# Patient Record
Sex: FEMALE
[De-identification: names, ages, dates, MRNs, and addresses within clinical notes are randomized per-mention and may not be internally consistent; named-entity substitution may affect disease eponyms.]

---

## 2020-11-15 ENCOUNTER — HOSPITAL ENCOUNTER (EMERGENCY)
Dept: HOSPITAL 47 - EC | Age: 75
Discharge: HOME | End: 2020-11-15
Payer: MEDICARE

## 2020-11-15 VITALS — HEART RATE: 96 BPM | DIASTOLIC BLOOD PRESSURE: 70 MMHG | SYSTOLIC BLOOD PRESSURE: 119 MMHG

## 2020-11-15 VITALS — TEMPERATURE: 100.3 F

## 2020-11-15 VITALS — RESPIRATION RATE: 18 BRPM

## 2020-11-15 DIAGNOSIS — J12.89: ICD-10-CM

## 2020-11-15 DIAGNOSIS — N39.0: ICD-10-CM

## 2020-11-15 DIAGNOSIS — U07.1: Primary | ICD-10-CM

## 2020-11-15 LAB
ALBUMIN SERPL-MCNC: 4.3 G/DL (ref 3.5–5)
ALP SERPL-CCNC: 79 U/L (ref 38–126)
ALT SERPL-CCNC: 16 U/L (ref 4–34)
ANION GAP SERPL CALC-SCNC: 11 MMOL/L
AST SERPL-CCNC: 29 U/L (ref 14–36)
BASOPHILS # BLD AUTO: 0 K/UL (ref 0–0.2)
BASOPHILS NFR BLD AUTO: 1 %
BUN SERPL-SCNC: 20 MG/DL (ref 7–17)
CALCIUM SPEC-MCNC: 9.2 MG/DL (ref 8.4–10.2)
CHLORIDE SERPL-SCNC: 99 MMOL/L (ref 98–107)
CO2 SERPL-SCNC: 23 MMOL/L (ref 22–30)
EOSINOPHIL # BLD AUTO: 0 K/UL (ref 0–0.7)
EOSINOPHIL NFR BLD AUTO: 1 %
ERYTHROCYTE [DISTWIDTH] IN BLOOD BY AUTOMATED COUNT: 4.4 M/UL (ref 3.8–5.4)
ERYTHROCYTE [DISTWIDTH] IN BLOOD: 12.4 % (ref 11.5–15.5)
GLUCOSE BLD-MCNC: 84 MG/DL (ref 75–99)
GLUCOSE SERPL-MCNC: 66 MG/DL (ref 74–99)
GRAN CASTS UR QL COMP ASSIST: 1 /LPF
HCT VFR BLD AUTO: 40.3 % (ref 34–46)
HGB BLD-MCNC: 13.5 GM/DL (ref 11.4–16)
HYALINE CASTS UR QL AUTO: 13 /LPF (ref 0–2)
LYMPHOCYTES # SPEC AUTO: 1.3 K/UL (ref 1–4.8)
LYMPHOCYTES NFR SPEC AUTO: 18 %
MCH RBC QN AUTO: 30.7 PG (ref 25–35)
MCHC RBC AUTO-ENTMCNC: 33.5 G/DL (ref 31–37)
MCV RBC AUTO: 91.7 FL (ref 80–100)
MONOCYTES # BLD AUTO: 0.4 K/UL (ref 0–1)
MONOCYTES NFR BLD AUTO: 5 %
NEUTROPHILS # BLD AUTO: 5.3 K/UL (ref 1.3–7.7)
NEUTROPHILS NFR BLD AUTO: 75 %
PH UR: 5.5 [PH] (ref 5–8)
PLATELET # BLD AUTO: 243 K/UL (ref 150–450)
POTASSIUM SERPL-SCNC: 3.7 MMOL/L (ref 3.5–5.1)
PROT SERPL-MCNC: 7.9 G/DL (ref 6.3–8.2)
PROT UR QL: (no result)
RBC UR QL: 7 /HPF (ref 0–5)
SODIUM SERPL-SCNC: 133 MMOL/L (ref 137–145)
SP GR UR: 1.04 (ref 1–1.03)
SQUAMOUS UR QL AUTO: 6 /HPF (ref 0–4)
UROBILINOGEN UR QL STRIP: 2 MG/DL (ref ?–2)
WBC # BLD AUTO: 7.1 K/UL (ref 3.8–10.6)
WBC # UR AUTO: 29 /HPF (ref 0–5)

## 2020-11-15 PROCEDURE — 99285 EMERGENCY DEPT VISIT HI MDM: CPT

## 2020-11-15 PROCEDURE — 96361 HYDRATE IV INFUSION ADD-ON: CPT

## 2020-11-15 PROCEDURE — 93005 ELECTROCARDIOGRAM TRACING: CPT

## 2020-11-15 PROCEDURE — 84484 ASSAY OF TROPONIN QUANT: CPT

## 2020-11-15 PROCEDURE — 81001 URINALYSIS AUTO W/SCOPE: CPT

## 2020-11-15 PROCEDURE — 71046 X-RAY EXAM CHEST 2 VIEWS: CPT

## 2020-11-15 PROCEDURE — 87502 INFLUENZA DNA AMP PROBE: CPT

## 2020-11-15 PROCEDURE — 87086 URINE CULTURE/COLONY COUNT: CPT

## 2020-11-15 PROCEDURE — 85025 COMPLETE CBC W/AUTO DIFF WBC: CPT

## 2020-11-15 PROCEDURE — 87040 BLOOD CULTURE FOR BACTERIA: CPT

## 2020-11-15 PROCEDURE — 83605 ASSAY OF LACTIC ACID: CPT

## 2020-11-15 PROCEDURE — 80053 COMPREHEN METABOLIC PANEL: CPT

## 2020-11-15 PROCEDURE — 96374 THER/PROPH/DIAG INJ IV PUSH: CPT

## 2020-11-15 PROCEDURE — 36415 COLL VENOUS BLD VENIPUNCTURE: CPT

## 2020-11-15 NOTE — XR
EXAMINATION TYPE: XR chest 2V

 

DATE OF EXAM: 11/15/2020

 

COMPARISON: NONE

 

HISTORY: Weakness and cough

 

TECHNIQUE: 2 views

 

FINDINGS: There is some coarse infiltrate in the periphery of the left lung. There is probably some c
alcified pleural plaque on the left lateral chest wall. Thoracic aorta is atheromatous. There is no h
eart failure.

 

IMPRESSION: Coarse lateral left side pulmonary infiltrate and pleural plaque formation. No heart fail
ure seen. There is probably pulmonary interstitial fibrosis.

## 2020-11-15 NOTE — ED
General Adult HPI





- General


Source: patient, family, RN notes reviewed, old records reviewed


Mode of arrival: ambulatory


Limitations: language barrier





<Bobby Parra - Last Filed: 11/15/20 22:50>





<Ignacia Freeman - Last Filed: 11/17/20 07:46>





- General


Chief complaint: Weakness


Stated complaint: weakness


Time Seen by Provider: 11/15/20 16:13





- History of Present Illness


Initial comments: 


75-year-old female patient to ED for generalized weakness and fever with a mild 

cough for the last 3 days.  Denies any areas of pain.  Denies any shortness of 

breath.  Denies any other acute complaints.





Systemic: Pt denies fatigue, rash. Pt denies weakness, night sweats, weight 

loss. 


Neuro: Pt denies headache, visual disturbances, syncope or pre-syncope.


HEENT: Pt denies ocular discharge or irritation, otalgia, rhinorrhea, 

pharyngitis or notable lymphadenopathy. 


Cardiopulmonary: Pt denies chest pain, SOB, heart palpitations, dyspnea on 

exertion.  


Abdominal/GI: Pt denies abdominal pain, n/v/d. 


: Denies new onset urinary or bowel incontinence.  


MSK: Pt denies myalgia, loss of strength or function in extremities. 


Neuro: Pt denies new onset weakness, paresthesias. 


 (Bobby Parra)





- Related Data


                                  Previous Rx's











 Medication  Instructions  Recorded


 


Levofloxacin [Levaquin] 750 mg PO DAILY 7 Days  tab 11/15/20











                                    Allergies











Allergy/AdvReac Type Severity Reaction Status Date / Time


 


No Known Allergies Allergy   Verified 11/15/20 16:08














Review of Systems


ROS Other: All systems not noted in ROS Statement are negative.





<Bobby Parra - Last Filed: 11/15/20 22:50>


ROS Other: All systems not noted in ROS Statement are negative.





<Ignacia Freeman - Last Filed: 11/17/20 07:46>


ROS Statement: 


Those systems with pertinent positive or pertinent negative responses have been 

documented in the HPI.








Past Medical History


Past Medical History: Diabetes Mellitus


History of Any Multi-Drug Resistant Organisms: None Reported


Past Surgical History: No Surgical Hx Reported


Past Psychological History: No Psychological Hx Reported


Smoking Status: Never smoker


Past Alcohol Use History: None Reported


Past Drug Use History: None Reported





<Bobby Parra - Last Filed: 11/15/20 22:50>





General Exam


Limitations: language barrier





<Bobby Parra - Last Filed: 11/15/20 22:50>





- General Exam Comments


Initial Comments: 








Constitutional: NAD, AOX3, Pt has pleasant affect. 


HEENT: NC/AT, trachea midline, neck supple, no lymphadenopathy. External ears 

appear normal, without discharge. Mucous membranes moist. Eyes PERRLA, EOM 

intact. There is no scleral icterus. No pallor noted. 


Cardiopulmonary: RRR, no murmurs, rubs or gallops, no JVD noted. Lungs CTAB in 

anterior and posterior fields. No peripheral edema. 


Abdominal exam: Abdomen soft and non-distended. Abdomen non-tender to palpation 

in all 4 quadrants. Bowel sounds active in LLQ. No hepatosplenomegaly. No ecchym

osis


Neuro: CN II-XII intact. No nuchal rigidity. No raccon eyes, no finch sign, no 

hemotympanum. No cervical spinal tenderness. 


MSK: No posterior calf tenderness bilaterally, homans sign negative bilaterally.

 Posterior tibialis and radial pulse +2 bilaterally. Sensation intact in upper 

and lower extremities. Full active ROM in upper and lower extremities, 5/5 

stregnth. 








 (Bobby Parra)





Course





                                   Vital Signs











  11/15/20 11/15/20 11/15/20





  16:08 16:40 17:00


 


Temperature 100.3 F H  


 


Pulse Rate 124 H 108 H 101 H


 


Respiratory 20 20 20





Rate   


 


Blood Pressure 122/74 107/66 107/66


 


O2 Sat by Pulse 97 95 95





Oximetry   














  11/15/20 11/15/20





  18:00 19:00


 


Temperature  


 


Pulse Rate 98 96


 


Respiratory 18 18





Rate  


 


Blood Pressure 109/72 119/70


 


O2 Sat by Pulse 96 95





Oximetry  














Medical Decision Making





- Lab Data


Result diagrams: 


                                 11/15/20 16:29





                                 11/15/20 16:29





- EKG Data


-: EKG Interpreted by Me (and Dr. Freeman )





<Bobby Parra - Last Filed: 11/15/20 22:50>





- Lab Data


Result diagrams: 


                                 11/15/20 16:29





                                 11/15/20 16:29





<Ignacia Freeman - Last Filed: 11/17/20 07:46>





- Medical Decision Making


75 year old female patient to ED for weakness, fever, cough. Pt VSS do display 

mild fever. Laboratory investigations reveal mild UTI. x-ray reveals possible 

pulmonary fibrosis and a coarse infiltrate.  Offered admission, she reports she 

is feeling fine and wants to go home. Son did provide translation. She'll be 

placed on Levaquin for 1 week and have close patient follow-up with primary care

 provider and return precautions.  Case discussed with Dr. Freeman. 


 (Bobby Parra)


I was available for consultation in the emergency department.  The history and 

physical exam were done by the midlevel provider. I was consulted for this 

patients care. I reviewed the case with the midlevel provider and based on 

their presentation of the patient, I agree with the assessment, medical decision

 making and plan of care as documented. Recommended admission however patient 

refused. She is alert, lucid and capable of making her own decisions. 





Chart was dictated using Dragon dictation software.  Attempts were made to 

correct any dictation errors however some typographical errors may persist. 





Patient seen and evaluated during state of emergency due to Covid-19.  

(Ignacia Freeman)





- Lab Data





                                   Lab Results











  11/15/20 11/15/20 11/15/20 Range/Units





  16:29 16:29 16:29 


 


WBC  7.1    (3.8-10.6)  k/uL


 


RBC  4.40    (3.80-5.40)  m/uL


 


Hgb  13.5    (11.4-16.0)  gm/dL


 


Hct  40.3    (34.0-46.0)  %


 


MCV  91.7    (80.0-100.0)  fL


 


MCH  30.7    (25.0-35.0)  pg


 


MCHC  33.5    (31.0-37.0)  g/dL


 


RDW  12.4    (11.5-15.5)  %


 


Plt Count  243    (150-450)  k/uL


 


MPV  7.7    


 


Neutrophils %  75    %


 


Lymphocytes %  18    %


 


Monocytes %  5    %


 


Eosinophils %  1    %


 


Basophils %  1    %


 


Neutrophils #  5.3    (1.3-7.7)  k/uL


 


Lymphocytes #  1.3    (1.0-4.8)  k/uL


 


Monocytes #  0.4    (0-1.0)  k/uL


 


Eosinophils #  0.0    (0-0.7)  k/uL


 


Basophils #  0.0    (0-0.2)  k/uL


 


Sodium    133 L  (137-145)  mmol/L


 


Potassium    3.7  (3.5-5.1)  mmol/L


 


Chloride    99  ()  mmol/L


 


Carbon Dioxide    23  (22-30)  mmol/L


 


Anion Gap    11  mmol/L


 


BUN    20 H  (7-17)  mg/dL


 


Creatinine    1.02  (0.52-1.04)  mg/dL


 


Est GFR (CKD-EPI)AfAm    63  (>60 ml/min/1.73 sqM)  


 


Est GFR (CKD-EPI)NonAf    55  (>60 ml/min/1.73 sqM)  


 


Glucose    66 L  (74-99)  mg/dL


 


POC Glucose (mg/dL)     (75-99)  mg/dL


 


POC Glu Operater ID     


 


Plasma Lactic Acid Jose Luis     (0.7-2.0)  mmol/L


 


Calcium    9.2  (8.4-10.2)  mg/dL


 


Total Bilirubin    0.5  (0.2-1.3)  mg/dL


 


AST    29  (14-36)  U/L


 


ALT    16  (4-34)  U/L


 


Alkaline Phosphatase    79  ()  U/L


 


Troponin I     (0.000-0.034)  ng/mL


 


Total Protein    7.9  (6.3-8.2)  g/dL


 


Albumin    4.3  (3.5-5.0)  g/dL


 


Urine Color   Yellow   


 


Urine Appearance   Cloudy H   (Clear)  


 


Urine pH   5.5   (5.0-8.0)  


 


Ur Specific Gravity   1.039 H   (1.001-1.035)  


 


Urine Protein   2+ H   (Negative)  


 


Urine Glucose (UA)   Negative   (Negative)  


 


Urine Ketones   Negative   (Negative)  


 


Urine Blood   Negative   (Negative)  


 


Urine Nitrite   Negative   (Negative)  


 


Urine Bilirubin   Negative   (Negative)  


 


Urine Urobilinogen   2.0   (<2.0)  mg/dL


 


Ur Leukocyte Esterase   Large H   (Negative)  


 


Urine RBC   7 H   (0-5)  /hpf


 


Urine WBC   29 H   (0-5)  /hpf


 


Ur Squamous Epith Cells   6 H   (0-4)  /hpf


 


Hyaline Casts   13 H   (0-2)  /lpf


 


Granular Casts   1   (0)  /lpf


 


Urine Mucus   Few H   (None)  /hpf


 


Influenza Type A RNA     (Not Detectd)  


 


Influenza Type B (PCR)     (Not Detectd)  














  11/15/20 11/15/20 11/15/20 Range/Units





  16:29 16:29 16:29 


 


WBC     (3.8-10.6)  k/uL


 


RBC     (3.80-5.40)  m/uL


 


Hgb     (11.4-16.0)  gm/dL


 


Hct     (34.0-46.0)  %


 


MCV     (80.0-100.0)  fL


 


MCH     (25.0-35.0)  pg


 


MCHC     (31.0-37.0)  g/dL


 


RDW     (11.5-15.5)  %


 


Plt Count     (150-450)  k/uL


 


MPV     


 


Neutrophils %     %


 


Lymphocytes %     %


 


Monocytes %     %


 


Eosinophils %     %


 


Basophils %     %


 


Neutrophils #     (1.3-7.7)  k/uL


 


Lymphocytes #     (1.0-4.8)  k/uL


 


Monocytes #     (0-1.0)  k/uL


 


Eosinophils #     (0-0.7)  k/uL


 


Basophils #     (0-0.2)  k/uL


 


Sodium     (137-145)  mmol/L


 


Potassium     (3.5-5.1)  mmol/L


 


Chloride     ()  mmol/L


 


Carbon Dioxide     (22-30)  mmol/L


 


Anion Gap     mmol/L


 


BUN     (7-17)  mg/dL


 


Creatinine     (0.52-1.04)  mg/dL


 


Est GFR (CKD-EPI)AfAm     (>60 ml/min/1.73 sqM)  


 


Est GFR (CKD-EPI)NonAf     (>60 ml/min/1.73 sqM)  


 


Glucose     (74-99)  mg/dL


 


POC Glucose (mg/dL)     (75-99)  mg/dL


 


POC Glu Operater ID     


 


Plasma Lactic Acid Jose Luis  1.2    (0.7-2.0)  mmol/L


 


Calcium     (8.4-10.2)  mg/dL


 


Total Bilirubin     (0.2-1.3)  mg/dL


 


AST     (14-36)  U/L


 


ALT     (4-34)  U/L


 


Alkaline Phosphatase     ()  U/L


 


Troponin I    <0.012  (0.000-0.034)  ng/mL


 


Total Protein     (6.3-8.2)  g/dL


 


Albumin     (3.5-5.0)  g/dL


 


Urine Color     


 


Urine Appearance     (Clear)  


 


Urine pH     (5.0-8.0)  


 


Ur Specific Gravity     (1.001-1.035)  


 


Urine Protein     (Negative)  


 


Urine Glucose (UA)     (Negative)  


 


Urine Ketones     (Negative)  


 


Urine Blood     (Negative)  


 


Urine Nitrite     (Negative)  


 


Urine Bilirubin     (Negative)  


 


Urine Urobilinogen     (<2.0)  mg/dL


 


Ur Leukocyte Esterase     (Negative)  


 


Urine RBC     (0-5)  /hpf


 


Urine WBC     (0-5)  /hpf


 


Ur Squamous Epith Cells     (0-4)  /hpf


 


Hyaline Casts     (0-2)  /lpf


 


Granular Casts     (0)  /lpf


 


Urine Mucus     (None)  /hpf


 


Influenza Type A RNA   Not Detected   (Not Detectd)  


 


Influenza Type B (PCR)   Not Detected   (Not Detectd)  














  11/15/20 Range/Units





  17:55 


 


WBC   (3.8-10.6)  k/uL


 


RBC   (3.80-5.40)  m/uL


 


Hgb   (11.4-16.0)  gm/dL


 


Hct   (34.0-46.0)  %


 


MCV   (80.0-100.0)  fL


 


MCH   (25.0-35.0)  pg


 


MCHC   (31.0-37.0)  g/dL


 


RDW   (11.5-15.5)  %


 


Plt Count   (150-450)  k/uL


 


MPV   


 


Neutrophils %   %


 


Lymphocytes %   %


 


Monocytes %   %


 


Eosinophils %   %


 


Basophils %   %


 


Neutrophils #   (1.3-7.7)  k/uL


 


Lymphocytes #   (1.0-4.8)  k/uL


 


Monocytes #   (0-1.0)  k/uL


 


Eosinophils #   (0-0.7)  k/uL


 


Basophils #   (0-0.2)  k/uL


 


Sodium   (137-145)  mmol/L


 


Potassium   (3.5-5.1)  mmol/L


 


Chloride   ()  mmol/L


 


Carbon Dioxide   (22-30)  mmol/L


 


Anion Gap   mmol/L


 


BUN   (7-17)  mg/dL


 


Creatinine   (0.52-1.04)  mg/dL


 


Est GFR (CKD-EPI)AfAm   (>60 ml/min/1.73 sqM)  


 


Est GFR (CKD-EPI)NonAf   (>60 ml/min/1.73 sqM)  


 


Glucose   (74-99)  mg/dL


 


POC Glucose (mg/dL)  84  (75-99)  mg/dL


 


POC Glu Operater ID  Sulma Nuno  


 


Plasma Lactic Acid Jose Luis   (0.7-2.0)  mmol/L


 


Calcium   (8.4-10.2)  mg/dL


 


Total Bilirubin   (0.2-1.3)  mg/dL


 


AST   (14-36)  U/L


 


ALT   (4-34)  U/L


 


Alkaline Phosphatase   ()  U/L


 


Troponin I   (0.000-0.034)  ng/mL


 


Total Protein   (6.3-8.2)  g/dL


 


Albumin   (3.5-5.0)  g/dL


 


Urine Color   


 


Urine Appearance   (Clear)  


 


Urine pH   (5.0-8.0)  


 


Ur Specific Gravity   (1.001-1.035)  


 


Urine Protein   (Negative)  


 


Urine Glucose (UA)   (Negative)  


 


Urine Ketones   (Negative)  


 


Urine Blood   (Negative)  


 


Urine Nitrite   (Negative)  


 


Urine Bilirubin   (Negative)  


 


Urine Urobilinogen   (<2.0)  mg/dL


 


Ur Leukocyte Esterase   (Negative)  


 


Urine RBC   (0-5)  /hpf


 


Urine WBC   (0-5)  /hpf


 


Ur Squamous Epith Cells   (0-4)  /hpf


 


Hyaline Casts   (0-2)  /lpf


 


Granular Casts   (0)  /lpf


 


Urine Mucus   (None)  /hpf


 


Influenza Type A RNA   (Not Detectd)  


 


Influenza Type B (PCR)   (Not Detectd)  














- EKG Data


EKG Comments: 


ventricular rate 113, VT interval 144, QRS 70, QT//441.  Sinus 

tachycardia, possible inferior infarct age undetermined.  No concern for acute 

ischemia at this time.


 (Bobby Parra)





Disposition


Is patient prescribed a controlled substance at d/c from ED?: No





<Bobby Parra - Last Filed: 11/15/20 22:50>





<Ignacia Freeman - Last Filed: 11/17/20 07:46>


Clinical Impression: 


 UTI (urinary tract infection), Pneumonia





Disposition: HOME SELF-CARE


Condition: Stable


Instructions (If sedation given, give patient instructions):  Urinary Tract 

Infection in Women (ED), Pneumonia (ED)


Additional Instructions: 


Follow up with PCP tomorrow. Take antibiotics as directed. Return to ED with any

 worsening symptoms. 











Prescriptions: 


Levofloxacin [Levaquin] 750 mg PO DAILY 7 Days  tab


Referrals: 


Joss Spear MD [REFERRING] - 1-2 days

## 2020-11-17 ENCOUNTER — HOSPITAL ENCOUNTER (EMERGENCY)
Dept: HOSPITAL 47 - EC | Age: 75
Discharge: HOME | End: 2020-11-17
Payer: MEDICARE

## 2020-11-17 VITALS
DIASTOLIC BLOOD PRESSURE: 63 MMHG | SYSTOLIC BLOOD PRESSURE: 98 MMHG | TEMPERATURE: 98 F | HEART RATE: 86 BPM | RESPIRATION RATE: 16 BRPM

## 2020-11-17 DIAGNOSIS — E11.9: ICD-10-CM

## 2020-11-17 DIAGNOSIS — E86.0: ICD-10-CM

## 2020-11-17 DIAGNOSIS — R11.2: Primary | ICD-10-CM

## 2020-11-17 DIAGNOSIS — Z79.899: ICD-10-CM

## 2020-11-17 DIAGNOSIS — N39.0: ICD-10-CM

## 2020-11-17 DIAGNOSIS — Z79.84: ICD-10-CM

## 2020-11-17 DIAGNOSIS — J18.9: ICD-10-CM

## 2020-11-17 LAB
ALBUMIN SERPL-MCNC: 3.8 G/DL (ref 3.5–5)
ALP SERPL-CCNC: 62 U/L (ref 38–126)
ALT SERPL-CCNC: 15 U/L (ref 4–34)
ANION GAP SERPL CALC-SCNC: 8 MMOL/L
AST SERPL-CCNC: 35 U/L (ref 14–36)
BASOPHILS # BLD AUTO: 0.1 K/UL (ref 0–0.2)
BASOPHILS NFR BLD AUTO: 2 %
BUN SERPL-SCNC: 13 MG/DL (ref 7–17)
CALCIUM SPEC-MCNC: 8.6 MG/DL (ref 8.4–10.2)
CHLORIDE SERPL-SCNC: 94 MMOL/L (ref 98–107)
CO2 SERPL-SCNC: 25 MMOL/L (ref 22–30)
EOSINOPHIL # BLD AUTO: 0 K/UL (ref 0–0.7)
EOSINOPHIL NFR BLD AUTO: 1 %
ERYTHROCYTE [DISTWIDTH] IN BLOOD BY AUTOMATED COUNT: 4.03 M/UL (ref 3.8–5.4)
ERYTHROCYTE [DISTWIDTH] IN BLOOD: 11.8 % (ref 11.5–15.5)
GLUCOSE BLD-MCNC: 58 MG/DL (ref 75–99)
GLUCOSE SERPL-MCNC: 52 MG/DL (ref 74–99)
HCT VFR BLD AUTO: 36.5 % (ref 34–46)
HGB BLD-MCNC: 12.4 GM/DL (ref 11.4–16)
LYMPHOCYTES # SPEC AUTO: 0.7 K/UL (ref 1–4.8)
LYMPHOCYTES NFR SPEC AUTO: 10 %
MCH RBC QN AUTO: 30.8 PG (ref 25–35)
MCHC RBC AUTO-ENTMCNC: 34 G/DL (ref 31–37)
MCV RBC AUTO: 90.6 FL (ref 80–100)
MONOCYTES # BLD AUTO: 0.4 K/UL (ref 0–1)
MONOCYTES NFR BLD AUTO: 6 %
NEUTROPHILS # BLD AUTO: 5.3 K/UL (ref 1.3–7.7)
NEUTROPHILS NFR BLD AUTO: 81 %
PH UR: 6.5 [PH] (ref 5–8)
PLATELET # BLD AUTO: 233 K/UL (ref 150–450)
POTASSIUM SERPL-SCNC: 3.7 MMOL/L (ref 3.5–5.1)
PROT SERPL-MCNC: 7.1 G/DL (ref 6.3–8.2)
PROT UR QL: (no result)
RBC UR QL: 1 /HPF (ref 0–5)
SODIUM SERPL-SCNC: 127 MMOL/L (ref 137–145)
SP GR UR: 1.03 (ref 1–1.03)
SQUAMOUS UR QL AUTO: 3 /HPF (ref 0–4)
UROBILINOGEN UR QL STRIP: <2 MG/DL (ref ?–2)
WBC # BLD AUTO: 6.6 K/UL (ref 3.8–10.6)
WBC # UR AUTO: 3 /HPF (ref 0–5)

## 2020-11-17 PROCEDURE — 85025 COMPLETE CBC W/AUTO DIFF WBC: CPT

## 2020-11-17 PROCEDURE — 99284 EMERGENCY DEPT VISIT MOD MDM: CPT

## 2020-11-17 PROCEDURE — 36415 COLL VENOUS BLD VENIPUNCTURE: CPT

## 2020-11-17 PROCEDURE — 96361 HYDRATE IV INFUSION ADD-ON: CPT

## 2020-11-17 PROCEDURE — 96374 THER/PROPH/DIAG INJ IV PUSH: CPT

## 2020-11-17 PROCEDURE — 80053 COMPREHEN METABOLIC PANEL: CPT

## 2020-11-17 PROCEDURE — 81001 URINALYSIS AUTO W/SCOPE: CPT

## 2020-11-17 NOTE — ED
General Adult HPI





- General


Chief complaint: Nausea/Vomiting/Diarrhea


Stated complaint: Vomiting,Covid Exposure


Time Seen by Provider: 11/17/20 14:53


Source: patient


Mode of arrival: ambulatory


Limitations: language barrier





- History of Present Illness


Initial comments: 





Patient is a 75-year-old female presenting to the emergency Department with 

complaints of nausea and vomiting that started yesterday.  Patient speaks very 

little English, her son is here helping translate.  Patient was in the ER 2 days

ago and was diagnosed with a UTI as well as pneumonia.  Patient also states her 

 just tested positive for Covid.  She also did have a Covid test however 

this is still pending.  Prescribed Levaquin which she has been taking, she did 

take a dose today.  Patient states yesterday she started having nausea and had a

few episodes of vomiting and then again today.  She denies any abdominal pain, 

no chest pain, no shortness of breath.  She has been having a little bit of a 

fever.  She has been able to eat and drink until today, because of the nausea.  

She denies any diarrhea.  She denies having headache, no dizziness.  She has no 

further complaints at this time.  Upon arrival to the ER, she was febrile 100, 

rest of vitals are normal.





- Related Data


                                Home Medications











 Medication  Instructions  Recorded  Confirmed


 


Lisinopril-Hctz 10-12.5 mg 1 tab PO DAILY 11/17/20 11/17/20





[Zestoretic 10-12.5]   


 


Lovastatin [Mevacor] 20 mg PO DAILY 11/17/20 11/17/20


 


glyBURIDE/METFORMIN HCL 1 tab PO AC-BID 11/17/20 11/17/20





[Glucovance 5-500 mg]   








                                  Previous Rx's











 Medication  Instructions  Recorded


 


Levofloxacin [Levaquin] 750 mg PO DAILY 7 Days  tab 11/15/20


 


Ondansetron Odt [Zofran Odt] 4 mg PO Q8HR PRN #10 tab 11/17/20











                                    Allergies











Allergy/AdvReac Type Severity Reaction Status Date / Time


 


No Known Allergies Allergy   Verified 11/17/20 15:52














Review of Systems


ROS Statement: 


Those systems with pertinent positive or pertinent negative responses have been 

documented in the HPI.





ROS Other: All systems not noted in ROS Statement are negative.





Past Medical History


Past Medical History: Diabetes Mellitus


History of Any Multi-Drug Resistant Organisms: None Reported


Past Surgical History: No Surgical Hx Reported


Past Psychological History: No Psychological Hx Reported


Smoking Status: Never smoker


Past Alcohol Use History: None Reported


Past Drug Use History: None Reported





General Exam





- General Exam Comments


Initial Comments: 





GENERAL: 


Patient is well-developed and well-nourished.  Patient is nontoxic and in no 

acute distress.





HEAD: 


Atraumatic, normocephalic.





EYES:


Pupils equal round and reactive to light, extraocular movements intact, sclera 

anicteric, conjunctiva are normal.  Eyelids were unremarkable.





ENT: 


TMs normal, nares patent, oropharynx clear without exudates.  Moist mucous 

membranes.





NECK: 


Normal range of motion, supple without lymphadenopathy or JVD.





LUNGS:


Unlabored respirations.  Breath sounds clear to auscultation bilaterally and 

equal.  No wheezes rales or rhonchi.





HEART:


Regular rate and rhythm without murmurs, rubs or gallops.





ABDOMEN: 


Soft, nontender, normoactive bowel sounds.  No guarding, no rebound.  No masses 

appreciated.





: Deferred 





MUSCULOSKELETAL: 


Normal extremities with adequate strength and normal range of motion, no pitting

or edema.  No clubbing or cyanosis.





NEUROLOGICAL: 


Patient is alert and oriented x 3.  Motor and sensory are also intact.  Cranial 

nerves II through XII grossly intact.  Symmetrical smile.  Normal speech, normal

gait.   





PSYCH:


Normal mood, normal affect.





SKIN:


 Warm, Dry, normal turgor, no rashes or lesions noted.


Limitations: language barrier





Course


                                   Vital Signs











  11/17/20 11/17/20





  14:38 16:43


 


Temperature 100 F H 98.0 F


 


Pulse Rate 95 86


 


Respiratory 18 16





Rate  


 


Blood Pressure 129/79 98/63


 


O2 Sat by Pulse 96 99





Oximetry  














Medical Decision Making





- Medical Decision Making





Patient is a 75-year-old female here for nausea and vomiting that started yest

erday.  She is currently being treated for pneumonia as well as a UTI with 

Levaquin.  She is on day 2.  Her  is also COVID +, her test is still 

pending, but presuming she is positive.  She did arrive febrile 100, rest of 

vitals are normal.  Her exam is unremarkable, no abdominal pain, her lungs sound

clear.  Patient looks well, nontoxic.  Laboratory shows a normal white count, 

sodium was a little low at 127, glucose is 52, urine does show improvement from 

her UTI.  Given 1 L of fluids and Zofran and has been resting comfortably in the

ER.  She said no vomiting episodes, no nausea.  Patient states she has not eaten

any food today secondary to the nausea but had continued to take her diabetic 

medication.  Patient was given crackers and juice.  She reports improvement.  

She is stable for discharge at this time.  I will send her home with Zofran and 

told her to continue with already prescribed Levaquin.  She needs to follow-up 

with her PCP tomorrow to have labs rechecked.  Patient and patient's son are in 

agreement with this plan of care.  Return parameters were discussed with them 

and they verbalized understanding.  Case discussed with .





- Lab Data


Result diagrams: 


                                 11/17/20 15:35





                                 11/17/20 15:35


                                   Lab Results











  11/17/20 11/17/20 11/17/20 Range/Units





  15:35 15:35 16:06 


 


WBC  6.6    (3.8-10.6)  k/uL


 


RBC  4.03    (3.80-5.40)  m/uL


 


Hgb  12.4    (11.4-16.0)  gm/dL


 


Hct  36.5    (34.0-46.0)  %


 


MCV  90.6    (80.0-100.0)  fL


 


MCH  30.8    (25.0-35.0)  pg


 


MCHC  34.0    (31.0-37.0)  g/dL


 


RDW  11.8    (11.5-15.5)  %


 


Plt Count  233    (150-450)  k/uL


 


MPV  7.2    


 


Neutrophils %  81    %


 


Lymphocytes %  10    %


 


Monocytes %  6    %


 


Eosinophils %  1    %


 


Basophils %  2    %


 


Neutrophils #  5.3    (1.3-7.7)  k/uL


 


Lymphocytes #  0.7 L    (1.0-4.8)  k/uL


 


Monocytes #  0.4    (0-1.0)  k/uL


 


Eosinophils #  0.0    (0-0.7)  k/uL


 


Basophils #  0.1    (0-0.2)  k/uL


 


Sodium   127 L   (137-145)  mmol/L


 


Potassium   3.7   (3.5-5.1)  mmol/L


 


Chloride   94 L   ()  mmol/L


 


Carbon Dioxide   25   (22-30)  mmol/L


 


Anion Gap   8   mmol/L


 


BUN   13   (7-17)  mg/dL


 


Creatinine   0.60   (0.52-1.04)  mg/dL


 


Est GFR (CKD-EPI)AfAm   >90   (>60 ml/min/1.73 sqM)  


 


Est GFR (CKD-EPI)NonAf   >90   (>60 ml/min/1.73 sqM)  


 


Glucose   52 L   (74-99)  mg/dL


 


POC Glucose (mg/dL)     (75-99)  mg/dL


 


POC Glu Operater ID     


 


Calcium   8.6   (8.4-10.2)  mg/dL


 


Total Bilirubin   0.5   (0.2-1.3)  mg/dL


 


AST   35   (14-36)  U/L


 


ALT   15   (4-34)  U/L


 


Alkaline Phosphatase   62   ()  U/L


 


Total Protein   7.1   (6.3-8.2)  g/dL


 


Albumin   3.8   (3.5-5.0)  g/dL


 


Urine Color    Yellow  


 


Urine Appearance    Clear  (Clear)  


 


Urine pH    6.5  (5.0-8.0)  


 


Ur Specific Gravity    1.026  (1.001-1.035)  


 


Urine Protein    2+ H  (Negative)  


 


Urine Glucose (UA)    Negative  (Negative)  


 


Urine Ketones    Negative  (Negative)  


 


Urine Blood    Small H  (Negative)  


 


Urine Nitrite    Negative  (Negative)  


 


Urine Bilirubin    Negative  (Negative)  


 


Urine Urobilinogen    <2.0  (<2.0)  mg/dL


 


Ur Leukocyte Esterase    Negative  (Negative)  


 


Urine RBC    1  (0-5)  /hpf


 


Urine WBC    3  (0-5)  /hpf


 


Ur Squamous Epith Cells    3  (0-4)  /hpf


 


Urine Mucus    Rare H  (None)  /hpf














  11/17/20 Range/Units





  17:11 


 


WBC   (3.8-10.6)  k/uL


 


RBC   (3.80-5.40)  m/uL


 


Hgb   (11.4-16.0)  gm/dL


 


Hct   (34.0-46.0)  %


 


MCV   (80.0-100.0)  fL


 


MCH   (25.0-35.0)  pg


 


MCHC   (31.0-37.0)  g/dL


 


RDW   (11.5-15.5)  %


 


Plt Count   (150-450)  k/uL


 


MPV   


 


Neutrophils %   %


 


Lymphocytes %   %


 


Monocytes %   %


 


Eosinophils %   %


 


Basophils %   %


 


Neutrophils #   (1.3-7.7)  k/uL


 


Lymphocytes #   (1.0-4.8)  k/uL


 


Monocytes #   (0-1.0)  k/uL


 


Eosinophils #   (0-0.7)  k/uL


 


Basophils #   (0-0.2)  k/uL


 


Sodium   (137-145)  mmol/L


 


Potassium   (3.5-5.1)  mmol/L


 


Chloride   ()  mmol/L


 


Carbon Dioxide   (22-30)  mmol/L


 


Anion Gap   mmol/L


 


BUN   (7-17)  mg/dL


 


Creatinine   (0.52-1.04)  mg/dL


 


Est GFR (CKD-EPI)AfAm   (>60 ml/min/1.73 sqM)  


 


Est GFR (CKD-EPI)NonAf   (>60 ml/min/1.73 sqM)  


 


Glucose   (74-99)  mg/dL


 


POC Glucose (mg/dL)  58 L  (75-99)  mg/dL


 


POC Glu Operater ID  Qing [email or web address]  


 


Calcium   (8.4-10.2)  mg/dL


 


Total Bilirubin   (0.2-1.3)  mg/dL


 


AST   (14-36)  U/L


 


ALT   (4-34)  U/L


 


Alkaline Phosphatase   ()  U/L


 


Total Protein   (6.3-8.2)  g/dL


 


Albumin   (3.5-5.0)  g/dL


 


Urine Color   


 


Urine Appearance   (Clear)  


 


Urine pH   (5.0-8.0)  


 


Ur Specific Gravity   (1.001-1.035)  


 


Urine Protein   (Negative)  


 


Urine Glucose (UA)   (Negative)  


 


Urine Ketones   (Negative)  


 


Urine Blood   (Negative)  


 


Urine Nitrite   (Negative)  


 


Urine Bilirubin   (Negative)  


 


Urine Urobilinogen   (<2.0)  mg/dL


 


Ur Leukocyte Esterase   (Negative)  


 


Urine RBC   (0-5)  /hpf


 


Urine WBC   (0-5)  /hpf


 


Ur Squamous Epith Cells   (0-4)  /hpf


 


Urine Mucus   (None)  /hpf














Disposition


Clinical Impression: 


 Dehydration, Nausea and vomiting





Disposition: HOME SELF-CARE


Condition: Stable


Instructions (If sedation given, give patient instructions):  Acute Nausea and 

Vomiting (ED)


Additional Instructions: 


Please return to the Emergency Department if symptoms worsen or any other 

concerns.


Lab work today shows dehydration, low blood sugar.


Make sure to eat dinner after discharge.


Follow-up with your PCP tomorrow to recheck blood work.


Continue taking the antibiotic as prescribed.





Prescriptions: 


Ondansetron Odt [Zofran Odt] 4 mg PO Q8HR PRN #10 tab


 PRN Reason: Nausea


Is patient prescribed a controlled substance at d/c from ED?: No


Referrals: 


Joss Spear MD [Primary Care Provider] - 1-2 days

## 2020-11-22 ENCOUNTER — HOSPITAL ENCOUNTER (INPATIENT)
Dept: HOSPITAL 47 - EC | Age: 75
LOS: 3 days | Discharge: HOME | DRG: 177 | End: 2020-11-25
Attending: INTERNAL MEDICINE | Admitting: INTERNAL MEDICINE
Payer: MEDICARE

## 2020-11-22 DIAGNOSIS — Z87.891: ICD-10-CM

## 2020-11-22 DIAGNOSIS — Z87.440: ICD-10-CM

## 2020-11-22 DIAGNOSIS — E11.649: ICD-10-CM

## 2020-11-22 DIAGNOSIS — Z87.01: ICD-10-CM

## 2020-11-22 DIAGNOSIS — J12.89: ICD-10-CM

## 2020-11-22 DIAGNOSIS — E86.1: ICD-10-CM

## 2020-11-22 DIAGNOSIS — R19.7: ICD-10-CM

## 2020-11-22 DIAGNOSIS — E87.2: ICD-10-CM

## 2020-11-22 DIAGNOSIS — E87.1: ICD-10-CM

## 2020-11-22 DIAGNOSIS — I10: ICD-10-CM

## 2020-11-22 DIAGNOSIS — E86.0: ICD-10-CM

## 2020-11-22 DIAGNOSIS — B37.49: ICD-10-CM

## 2020-11-22 DIAGNOSIS — U07.1: Primary | ICD-10-CM

## 2020-11-22 DIAGNOSIS — R11.2: ICD-10-CM

## 2020-11-22 DIAGNOSIS — E78.5: ICD-10-CM

## 2020-11-22 DIAGNOSIS — Z79.899: ICD-10-CM

## 2020-11-22 LAB
ALBUMIN SERPL-MCNC: 3.9 G/DL (ref 3.5–5)
ALP SERPL-CCNC: 114 U/L (ref 38–126)
ALT SERPL-CCNC: 18 U/L (ref 4–34)
ANION GAP SERPL CALC-SCNC: 11 MMOL/L
AST SERPL-CCNC: 35 U/L (ref 14–36)
BASOPHILS # BLD AUTO: 0.1 K/UL (ref 0–0.2)
BASOPHILS NFR BLD AUTO: 0 %
BUN SERPL-SCNC: 7 MG/DL (ref 7–17)
CALCIUM SPEC-MCNC: 9.2 MG/DL (ref 8.4–10.2)
CHLORIDE SERPL-SCNC: 89 MMOL/L (ref 98–107)
CO2 SERPL-SCNC: 28 MMOL/L (ref 22–30)
EOSINOPHIL # BLD AUTO: 0 K/UL (ref 0–0.7)
EOSINOPHIL NFR BLD AUTO: 0 %
ERYTHROCYTE [DISTWIDTH] IN BLOOD BY AUTOMATED COUNT: 4.35 M/UL (ref 3.8–5.4)
ERYTHROCYTE [DISTWIDTH] IN BLOOD: 11.7 % (ref 11.5–15.5)
GLUCOSE SERPL-MCNC: 120 MG/DL (ref 74–99)
HCT VFR BLD AUTO: 39.4 % (ref 34–46)
HGB BLD-MCNC: 13.6 GM/DL (ref 11.4–16)
HYALINE CASTS UR QL AUTO: 22 /LPF (ref 0–2)
LYMPHOCYTES # SPEC AUTO: 0.6 K/UL (ref 1–4.8)
LYMPHOCYTES NFR SPEC AUTO: 4 %
MAGNESIUM SPEC-SCNC: 1.8 MG/DL (ref 1.6–2.3)
MCH RBC QN AUTO: 31.3 PG (ref 25–35)
MCHC RBC AUTO-ENTMCNC: 34.6 G/DL (ref 31–37)
MCV RBC AUTO: 90.5 FL (ref 80–100)
MONOCYTES # BLD AUTO: 0.4 K/UL (ref 0–1)
MONOCYTES NFR BLD AUTO: 3 %
NEUTROPHILS # BLD AUTO: 12.7 K/UL (ref 1.3–7.7)
NEUTROPHILS NFR BLD AUTO: 92 %
PH UR: 6.5 [PH] (ref 5–8)
PLATELET # BLD AUTO: 425 K/UL (ref 150–450)
POTASSIUM SERPL-SCNC: 3.7 MMOL/L (ref 3.5–5.1)
PROT SERPL-MCNC: 7.5 G/DL (ref 6.3–8.2)
PROT UR QL: (no result)
RBC UR QL: 2 /HPF (ref 0–5)
SODIUM SERPL-SCNC: 128 MMOL/L (ref 137–145)
SP GR UR: 1.03 (ref 1–1.03)
SQUAMOUS UR QL AUTO: 13 /HPF (ref 0–4)
UROBILINOGEN UR QL STRIP: 2 MG/DL (ref ?–2)
WBC # BLD AUTO: 13.9 K/UL (ref 3.8–10.6)
WBC # UR AUTO: 17 /HPF (ref 0–5)

## 2020-11-22 PROCEDURE — 71046 X-RAY EXAM CHEST 2 VIEWS: CPT

## 2020-11-22 PROCEDURE — 81001 URINALYSIS AUTO W/SCOPE: CPT

## 2020-11-22 PROCEDURE — 85025 COMPLETE CBC W/AUTO DIFF WBC: CPT

## 2020-11-22 PROCEDURE — 83036 HEMOGLOBIN GLYCOSYLATED A1C: CPT

## 2020-11-22 PROCEDURE — 83735 ASSAY OF MAGNESIUM: CPT

## 2020-11-22 PROCEDURE — 84145 PROCALCITONIN (PCT): CPT

## 2020-11-22 PROCEDURE — 76857 US EXAM PELVIC LIMITED: CPT

## 2020-11-22 PROCEDURE — 36415 COLL VENOUS BLD VENIPUNCTURE: CPT

## 2020-11-22 PROCEDURE — 99285 EMERGENCY DEPT VISIT HI MDM: CPT

## 2020-11-22 PROCEDURE — 82728 ASSAY OF FERRITIN: CPT

## 2020-11-22 PROCEDURE — 80053 COMPREHEN METABOLIC PANEL: CPT

## 2020-11-22 PROCEDURE — 83615 LACTATE (LD) (LDH) ENZYME: CPT

## 2020-11-22 PROCEDURE — 86140 C-REACTIVE PROTEIN: CPT

## 2020-11-22 PROCEDURE — 96361 HYDRATE IV INFUSION ADD-ON: CPT

## 2020-11-22 PROCEDURE — 87086 URINE CULTURE/COLONY COUNT: CPT

## 2020-11-22 PROCEDURE — 85379 FIBRIN DEGRADATION QUANT: CPT

## 2020-11-22 PROCEDURE — 96374 THER/PROPH/DIAG INJ IV PUSH: CPT

## 2020-11-22 PROCEDURE — 87502 INFLUENZA DNA AMP PROBE: CPT

## 2020-11-22 PROCEDURE — 83605 ASSAY OF LACTIC ACID: CPT

## 2020-11-22 PROCEDURE — 87040 BLOOD CULTURE FOR BACTERIA: CPT

## 2020-11-22 PROCEDURE — 93005 ELECTROCARDIOGRAM TRACING: CPT

## 2020-11-22 PROCEDURE — 71045 X-RAY EXAM CHEST 1 VIEW: CPT

## 2020-11-22 PROCEDURE — 82550 ASSAY OF CK (CPK): CPT

## 2020-11-22 RX ADMIN — CEFAZOLIN SCH MLS/HR: 330 INJECTION, POWDER, FOR SOLUTION INTRAMUSCULAR; INTRAVENOUS at 15:23

## 2020-11-22 RX ADMIN — ENOXAPARIN SODIUM SCH MG: 40 INJECTION SUBCUTANEOUS at 15:25

## 2020-11-22 NOTE — ED
Nausea/Vomiting/Diarrhea HPI





- General


Chief complaint: Nausea/Vomiting/Diarrhea


Stated complaint: Vomiting


Time Seen by Provider: 11/22/20 13:05


Source: patient


Mode of arrival: ambulatory


Limitations: no limitations





- History of Present Illness


Initial comments: 





Patient is a 75-year-old female presenting to the emergency department with 

continued nausea, vomiting and now has developed a fever.  Patient tested 

positive for Covid one week ago.  She was seen in the ER 5 days ago for nausea 

and vomiting, she received some fluids and felt improvement.  She also completed

a course of Levaquin for a mild pneumonia.  Patient speaks little English, son i

s helping to translate.  Son states that she has been feeling better through the

week and has been taking the Zofran for the nausea but she ran out and started 

having vomiting today.  They also noticed that she started having a fever today 

as well.  She denies having a cough no shortness of breath or chest pain.  She 

is still having intermittent diarrhea.  She denies any dysuria.  She did not 

take any Tylenol or Motrin yet today.  She denies having any headache, sore 

throat, abdominal pain, she states it just feels nauseous and crampy.  She has 

no further complaints at this time.  Upon arrival to the ER, her temperature is 

100.0, pulse is 103, rest of vitals are normal.





- Related Data


                                Home Medications











 Medication  Instructions  Recorded  Confirmed


 


Lisinopril-Hctz 10-12.5 mg 1 tab PO DAILY 11/17/20 11/17/20





[Zestoretic 10-12.5]   


 


Lovastatin [Mevacor] 20 mg PO DAILY 11/17/20 11/17/20


 


glyBURIDE/METFORMIN HCL 1 tab PO AC-BID 11/17/20 11/17/20





[Glucovance 5-500 mg]   








                                  Previous Rx's











 Medication  Instructions  Recorded


 


Levofloxacin [Levaquin] 750 mg PO DAILY 7 Days  tab 11/15/20


 


Ondansetron Odt [Zofran Odt] 4 mg PO Q8HR PRN #10 tab 11/17/20











                                    Allergies











Allergy/AdvReac Type Severity Reaction Status Date / Time


 


No Known Allergies Allergy   Verified 11/22/20 13:01














Review of Systems


ROS Statement: 


Those systems with pertinent positive or pertinent negative responses have been 

documented in the HPI.





ROS Other: All systems not noted in ROS Statement are negative.





Past Medical History


Past Medical History: Diabetes Mellitus


History of Any Multi-Drug Resistant Organisms: None Reported


Past Surgical History: No Surgical Hx Reported


Past Psychological History: No Psychological Hx Reported


Smoking Status: Never smoker


Past Alcohol Use History: None Reported


Past Drug Use History: None Reported





General Exam





- General Exam Comments


Initial Comments: 





GENERAL: 


Patient is well-developed and well-nourished.  She appears fatigued, dry.





HEAD: 


Atraumatic, normocephalic.





EYES:


Pupils equal round and reactive to light, extraocular movements intact, sclera 

anicteric, conjunctiva are normal.  Eyelids were unremarkable.





ENT: 


TMs normal, nares patent, oropharynx clear without exudates.  Dry mucous m

embranes.





NECK: 


Normal range of motion, supple without lymphadenopathy or JVD.





LUNGS:


Unlabored respirations.  Breath sounds clear to auscultation bilaterally and 

equal.  No wheezes rales or rhonchi.





HEART:


Slighty tachycardia rate and rhythm without murmurs, rubs or gallops.





ABDOMEN: 


Soft, nontender, normoactive bowel sounds.  No guarding, no rebound.  No masses 

appreciated.





: Deferred 





MUSCULOSKELETAL: 


Normal extremities with adequate strength and normal range of motion, no pitting

or edema.  No clubbing or cyanosis.





NEUROLOGICAL: 


Patient is alert and oriented x 3.  Motor and sensory are also intact.  Cranial 

nerves II through XII grossly intact.  Symmetrical smile.  Normal speech, normal

gait.   





PSYCH:


Normal mood, normal affect.





SKIN:


 Warm, Dry, normal turgor, no rashes or lesions noted.


Limitations: no limitations





Course


                                   Vital Signs











  11/22/20 11/22/20





  12:58 15:01


 


Temperature 100.0 F H 


 


Pulse Rate 103 H 81


 


Respiratory 18 16





Rate  


 


Blood Pressure 124/78 131/79


 


O2 Sat by Pulse 97 95





Oximetry  














Medical Decision Making





- Medical Decision Making





Patient is a 75-year-old female here for nausea, vomiting, diarrhea for the past

week.  She did test positive for Covid one week ago.  She completed a course of 

Levaquin.  She did arrive febrile today, slightly tachycardia.  She denies any 

shortness of breath, chest pain, cough.  White count is 13.9, sodium is 128, 

lactic acid is elevated at 3.2 and CRP is 256, urine shows some mild WBCs, 3+ 

protein.  Chest x-ray shows worsening bilateral infiltrates.  Patient was given 

a liter bolus and will start on maintenance fluids.  Patient will be admitted 

for covid pneumonia, and dehydration.  Patient is in agreement with this plan of

care.  I will start her on Lovenox, give her a dose of Decadron as well.  Case 

discussed with .  Patient accepted by Dr. Nicole.  





- Lab Data


Result diagrams: 


                                 11/22/20 13:45





                                 11/22/20 13:45


                                   Lab Results











  11/22/20 11/22/20 11/22/20 Range/Units





  13:45 13:45 13:45 


 


WBC  13.9 H    (3.8-10.6)  k/uL


 


RBC  4.35    (3.80-5.40)  m/uL


 


Hgb  13.6    (11.4-16.0)  gm/dL


 


Hct  39.4    (34.0-46.0)  %


 


MCV  90.5    (80.0-100.0)  fL


 


MCH  31.3    (25.0-35.0)  pg


 


MCHC  34.6    (31.0-37.0)  g/dL


 


RDW  11.7    (11.5-15.5)  %


 


Plt Count  425    (150-450)  k/uL


 


MPV  7.0    


 


Neutrophils %  92    %


 


Lymphocytes %  4    %


 


Monocytes %  3    %


 


Eosinophils %  0    %


 


Basophils %  0    %


 


Neutrophils #  12.7 H    (1.3-7.7)  k/uL


 


Lymphocytes #  0.6 L    (1.0-4.8)  k/uL


 


Monocytes #  0.4    (0-1.0)  k/uL


 


Eosinophils #  0.0    (0-0.7)  k/uL


 


Basophils #  0.1    (0-0.2)  k/uL


 


Sodium   128 L   (137-145)  mmol/L


 


Potassium   3.7   (3.5-5.1)  mmol/L


 


Chloride   89 L   ()  mmol/L


 


Carbon Dioxide   28   (22-30)  mmol/L


 


Anion Gap   11   mmol/L


 


BUN   7   (7-17)  mg/dL


 


Creatinine   0.63   (0.52-1.04)  mg/dL


 


Est GFR (CKD-EPI)AfAm   >90   (>60 ml/min/1.73 sqM)  


 


Est GFR (CKD-EPI)NonAf   89   (>60 ml/min/1.73 sqM)  


 


Glucose   120 H   (74-99)  mg/dL


 


Plasma Lactic Acid Jose Luis    3.2 H*  (0.7-2.0)  mmol/L


 


Calcium   9.2   (8.4-10.2)  mg/dL


 


Magnesium   1.8   (1.6-2.3)  mg/dL


 


Total Bilirubin   0.7   (0.2-1.3)  mg/dL


 


AST   35   (14-36)  U/L


 


ALT   18   (4-34)  U/L


 


Alkaline Phosphatase   114   ()  U/L


 


C-Reactive Protein   256.0 H   (<10.0)  mg/L


 


Total Protein   7.5   (6.3-8.2)  g/dL


 


Albumin   3.9   (3.5-5.0)  g/dL


 


Urine Color     


 


Urine Appearance     (Clear)  


 


Urine pH     (5.0-8.0)  


 


Ur Specific Gravity     (1.001-1.035)  


 


Urine Protein     (Negative)  


 


Urine Glucose (UA)     (Negative)  


 


Urine Ketones     (Negative)  


 


Urine Blood     (Negative)  


 


Urine Nitrite     (Negative)  


 


Urine Bilirubin     (Negative)  


 


Urine Urobilinogen     (<2.0)  mg/dL


 


Ur Leukocyte Esterase     (Negative)  


 


Urine RBC     (0-5)  /hpf


 


Urine WBC     (0-5)  /hpf


 


Ur Squamous Epith Cells     (0-4)  /hpf


 


Amorphous Sediment     (None)  /hpf


 


Hyaline Casts     (0-2)  /lpf


 


Urine Mucus     (None)  /hpf


 


Influenza Type A RNA     (Not Detectd)  


 


Influenza Type B (PCR)     (Not Detectd)  














  11/22/20 11/22/20 Range/Units





  13:45 13:45 


 


WBC    (3.8-10.6)  k/uL


 


RBC    (3.80-5.40)  m/uL


 


Hgb    (11.4-16.0)  gm/dL


 


Hct    (34.0-46.0)  %


 


MCV    (80.0-100.0)  fL


 


MCH    (25.0-35.0)  pg


 


MCHC    (31.0-37.0)  g/dL


 


RDW    (11.5-15.5)  %


 


Plt Count    (150-450)  k/uL


 


MPV    


 


Neutrophils %    %


 


Lymphocytes %    %


 


Monocytes %    %


 


Eosinophils %    %


 


Basophils %    %


 


Neutrophils #    (1.3-7.7)  k/uL


 


Lymphocytes #    (1.0-4.8)  k/uL


 


Monocytes #    (0-1.0)  k/uL


 


Eosinophils #    (0-0.7)  k/uL


 


Basophils #    (0-0.2)  k/uL


 


Sodium    (137-145)  mmol/L


 


Potassium    (3.5-5.1)  mmol/L


 


Chloride    ()  mmol/L


 


Carbon Dioxide    (22-30)  mmol/L


 


Anion Gap    mmol/L


 


BUN    (7-17)  mg/dL


 


Creatinine    (0.52-1.04)  mg/dL


 


Est GFR (CKD-EPI)AfAm    (>60 ml/min/1.73 sqM)  


 


Est GFR (CKD-EPI)NonAf    (>60 ml/min/1.73 sqM)  


 


Glucose    (74-99)  mg/dL


 


Plasma Lactic Acid Jose Luis    (0.7-2.0)  mmol/L


 


Calcium    (8.4-10.2)  mg/dL


 


Magnesium    (1.6-2.3)  mg/dL


 


Total Bilirubin    (0.2-1.3)  mg/dL


 


AST    (14-36)  U/L


 


ALT    (4-34)  U/L


 


Alkaline Phosphatase    ()  U/L


 


C-Reactive Protein    (<10.0)  mg/L


 


Total Protein    (6.3-8.2)  g/dL


 


Albumin    (3.5-5.0)  g/dL


 


Urine Color   Yellow  


 


Urine Appearance   Cloudy H  (Clear)  


 


Urine pH   6.5  (5.0-8.0)  


 


Ur Specific Gravity   1.033  (1.001-1.035)  


 


Urine Protein   3+ H  (Negative)  


 


Urine Glucose (UA)   Trace H  (Negative)  


 


Urine Ketones   Trace H  (Negative)  


 


Urine Blood   Small H  (Negative)  


 


Urine Nitrite   Negative  (Negative)  


 


Urine Bilirubin   Negative  (Negative)  


 


Urine Urobilinogen   2.0  (<2.0)  mg/dL


 


Ur Leukocyte Esterase   Trace H  (Negative)  


 


Urine RBC   2  (0-5)  /hpf


 


Urine WBC   17 H  (0-5)  /hpf


 


Ur Squamous Epith Cells   13 H  (0-4)  /hpf


 


Amorphous Sediment   Rare H  (None)  /hpf


 


Hyaline Casts   22 H  (0-2)  /lpf


 


Urine Mucus   Few H  (None)  /hpf


 


Influenza Type A RNA  Not Detected   (Not Detectd)  


 


Influenza Type B (PCR)  Not Detected   (Not Detectd)  














- EKG Data


EKG Comments: 





Normal sinus rhythm, possible inferior infarct age undetermined, no signs of 

acute ischemia.  Ventricular rate 89, VT interval 144, .  Similar to 

previous EKG in 11/15/2020.





Disposition


Clinical Impression: 


 Pneumonia due to COVID-19 virus, Dehydration, Nausea and vomiting





Disposition: ADMITTED AS IP TO THIS HOSP


Condition: Stable


Is patient prescribed a controlled substance at d/c from ED?: No


Decision Date: 11/22/20


Decision Time: 15:04

## 2020-11-22 NOTE — XR
EXAMINATION TYPE: XR chest 2V

 

DATE OF EXAM: 11/22/2020

 

COMPARISON: 11/15/2020.

 

HISTORY: Fever.

 

TECHNIQUE:  Frontal and lateral views of the chest are obtained.

 

FINDINGS:  There is progression of bilateral diffuse peripheral base, patchy opacities, now moderate 
to marked. No significant pleural effusion, or pneumothorax seen.  The cardiac silhouette size is wit
hin normal limits.   The osseous structures are intact.

 

IMPRESSION:  Progression of bilateral infiltrates.

## 2020-11-23 LAB
CK SERPL-CCNC: 40 U/L (ref 30–135)
FERRITIN SERPL-MCNC: 464.1 NG/ML (ref 10–291)
GLUCOSE BLD-MCNC: 197 MG/DL (ref 75–99)
GLUCOSE BLD-MCNC: 247 MG/DL (ref 75–99)
LDH SPEC-CCNC: 750 U/L (ref 313–618)

## 2020-11-23 RX ADMIN — CEFAZOLIN SCH MLS/HR: 330 INJECTION, POWDER, FOR SOLUTION INTRAMUSCULAR; INTRAVENOUS at 17:44

## 2020-11-23 RX ADMIN — ENOXAPARIN SODIUM SCH MG: 40 INJECTION SUBCUTANEOUS at 17:43

## 2020-11-23 RX ADMIN — INSULIN ASPART SCH UNIT: 100 INJECTION, SOLUTION INTRAVENOUS; SUBCUTANEOUS at 17:43

## 2020-11-23 RX ADMIN — INSULIN ASPART SCH UNIT: 100 INJECTION, SOLUTION INTRAVENOUS; SUBCUTANEOUS at 20:18

## 2020-11-23 RX ADMIN — DEXTROSE SCH MG: 50 INJECTION, SOLUTION INTRAVENOUS at 15:27

## 2020-11-23 RX ADMIN — Medication SCH MG: at 10:13

## 2020-11-23 RX ADMIN — ATORVASTATIN CALCIUM SCH MG: 10 TABLET, FILM COATED ORAL at 20:18

## 2020-11-23 RX ADMIN — OXYCODONE HYDROCHLORIDE AND ACETAMINOPHEN SCH MG: 500 TABLET ORAL at 10:12

## 2020-11-23 RX ADMIN — Medication SCH UNIT: at 10:12

## 2020-11-23 RX ADMIN — INSULIN ASPART SCH: 100 INJECTION, SOLUTION INTRAVENOUS; SUBCUTANEOUS at 12:35

## 2020-11-23 RX ADMIN — CEFAZOLIN SCH: 330 INJECTION, POWDER, FOR SOLUTION INTRAMUSCULAR; INTRAVENOUS at 05:29

## 2020-11-23 NOTE — P.HPIM
History of Present Illness


H&P Date: 11/23/20





HISTORY OF PRESENT ILLNESS


This is a 75-year-old  female patient of Dr. Jose M Cano with past 

medical history of hypertension, hyperlipidemia, diabetes mellitus type 2.  She 

initially presented to [facility] emergency center on November 

15 for generalized weakness and fever and cough for 3 days.  She was diagnosed 

with UTI and pneumonia and discharged on Levaquin for 7 day course.  She had: 19

testing at that time which was not back at the time of discharge.  Patient 

returned on November 17 with complaints of nausea and vomiting and was di

scharged home on Zofran.  Patient apparently ran out of Zofran yesterday and 

presented again to [facility] emergency center with nausea, 

vomiting and fever. She denies having any cough, shortness of breath or chest 

pain.  She is still having intermittent diarrhea.  No abdominal pain. Patient 

presented to [facility] emergency center, temperature max 

100.0, heart rate 69, blood pressure 116/67, pulse ox 94% on room air.  This 

morning pulse ox is 90% on room air.  WBC 13.9, sodium 128, potassium 3.7, 

creatinine 0.63.  C-reactive protein 256.  Urinalysis cloudy, blood small, WBCs 

17, squamous cells 13.Influenza A and B negative on November 22 and November 15.

 Chest x-ray done yesterday reveals progression of bilateral infiltrates.  

Patient was admitted to the MedSurg floor, started on IV fluids, Lovenox, 

dexamethasone, vitamin D, vitamin C, zinc.  Consult dated for pulmonary 

medicine.  Patient has history of smoking.  Information is limited from the 

patient due to limited English.





REVIEW OF SYSTEMS


Constitutional: Reports fever, Reports chills, Reports night sweats.  No weight 

change.  Reports weakness, Reportsfatigue Reports lethargy.  Reports daytime 

sleepiness.


EENT: No headache.  No blurred vision or double vision, no loss of vision.  No 

loss of Hearing, no ringing in the ears, no dizziness.  No nasal drainage or 

congestion.  No epistaxis.  No sore throat.


Lungs: No shortness of breath, cough, no sputum production.  No wheezing.


Cardiovascular: No chest pain, no lower extremity edema.  No palpitations.  No 

paroxysmal nocturnal dyspnea.  No orthopnea.  No lightheadedness or dizziness.  

No syncopal episodes.


Abdominal: Reports abdominal pain.  Reports nausea, Reportsvomiting.  Reports 

diarrhea.  No constipation.  No bloody or tarry stools.Reports loss of appetite.


Genitourinary: No dysuria, increased frequency, urgency.  


Musculoskeletal: No myalgias.  No muscle weakness, no gait dysfunction, no 

frequent falls.  


Integumentary: No wounds, no lesions.  No rash or pruritus.  


Neurologic: No aphasia. No facial droop. No change in mentation. No head injury.

No headache. No paralysis. No paresthesia.


Psychiatric: No depression.  No anxiety.  


Endocrine: No abnormal blood sugars.  





PHYSICAL EXAMINATION


Gen: This is a 75-year-old female.  She is resting in bed and appears to be com

fortable and in no acute distress.  No respiratory distress is noted.


HEENT: Head is atraumatic, normocephalic. Pupils equal, round. Sclerae is 

anicteric. 


NECK: Supple. No JVD. No lymphadenopathy. No thyromegaly. 


LUNGS: Clear to auscultation. No wheezes or rhonchi.  No intercostal 

retractions.


HEART: Regular rate and rhythm. No murmur. 


ABDOMEN: Soft. Bowel sounds are present. No masses.  No tenderness.


EXTREMITIES: No pedal edema.  No calf tenderness.


NEUROLOGICAL: Patient is awake, alert and oriented x3. Cranial nerves 2 through 

12 are grossly intact. 





ASSESSMENT AND PLAN


1.  Covid 19 pneumonia.  Continue dexamethasone 6 mg IV daily, Lovenox 40 mg 

daily, zinc, vitamin C, vitamin D, consult with pulmonary medicine.  Repeat 

chest x-ray today, blood culture, sputum culture.





2.  Intractable nausea and vomiting.  Continue Zofran 4 mg IV every 6 hours as 

needed, IV fluids or 0.9 normal saline at 75 mL per hour.





3.  Dehydration with hyponatremia.  Continue IV fluids.





4.  Hypertension.  Hold hydrochlorothiazide, resume lisinopril 10 mg daily with 

parameters.





5.  Hyperlipidemia.  Continue lovastatin 20 mg at bedtime.





6.  Diabetes mellitus type 2, uncontrolled with hypoglycemia.  Hold glyburide 

and metformin.  NovoLog scale only for now.  Hemoglobin A1c.





7.  GI prophylaxis.  Protonix.





8.  DVT prophylaxis.  Lovenox.





Patient will be admitted to the hospital for a minimum of 2 night stay.





DISCHARGE PLAN


Most likely return home.





Impression and plan of care have been directed as dictated by the signing 

physician.  Alma Gardner nurse practitioner acting as scribe for signing 

physician.





Past Medical History


Past Medical History: Diabetes Mellitus


History of Any Multi-Drug Resistant Organisms: None Reported


Past Surgical History: No Surgical Hx Reported


Past Psychological History: No Psychological Hx Reported


Smoking Status: Never smoker


Past Alcohol Use History: None Reported


Past Drug Use History: None Reported





Medications and Allergies


                                Home Medications











 Medication  Instructions  Recorded  Confirmed  Type


 


Lisinopril-Hctz 10-12.5 mg 1 tab PO HS 11/17/20 11/22/20 History





[Zestoretic 10-12.5]    


 


Lovastatin [Mevacor] 20 mg PO HS 11/17/20 11/22/20 History


 


glyBURIDE/METFORMIN HCL 1 tab PO AC-BID 11/17/20 11/22/20 History





[Glucovance 5-500 mg]    








                                    Allergies











Allergy/AdvReac Type Severity Reaction Status Date / Time


 


No Known Allergies Allergy   Verified 11/22/20 15:44














Physical Exam


Vitals: 


                                   Vital Signs











  Temp Pulse Pulse Resp BP BP Pulse Ox


 


 11/23/20 03:44  98.9 F   69  18   112/69  91 L


 


 11/22/20 22:55  98.4 F   69  18   116/67  94 L


 


 11/22/20 18:24  98.5 F  86   20  138/100   93 L


 


 11/22/20 15:01   81   16  131/79   95


 


 11/22/20 12:58  100.0 F H  103 H   18  124/78   97








                                Intake and Output











 11/22/20 11/23/20 11/23/20





 22:59 06:59 14:59


 


Intake Total  475 


 


Balance  475 


 


Intake:   


 


  Intake, IV Titration  475 





  Amount   


 


    Sodium Chloride 0.9% 1,  475 





    000 ml @ 75 mls/hr IV .   





    [id number] [facility] Rx#:[id number]   


 


Other:   


 


  # Voids  1 


 


  Weight 65.771 kg  














Results


CBC & Chem 7: 


                                 11/22/20 13:45





                                 11/22/20 13:45


Labs: 


                  Abnormal Lab Results - Last 24 Hours (Table)











  11/22/20 11/22/20 11/22/20 Range/Units





  13:45 13:45 13:45 


 


WBC  13.9 H    (3.8-10.6)  k/uL


 


Neutrophils #  12.7 H    (1.3-7.7)  k/uL


 


Lymphocytes #  0.6 L    (1.0-4.8)  k/uL


 


Sodium   128 L   (137-145)  mmol/L


 


Chloride   89 L   ()  mmol/L


 


Glucose   120 H   (74-99)  mg/dL


 


Plasma Lactic Acid Jose Luis    3.2 H*  (0.7-2.0)  mmol/L


 


C-Reactive Protein   256.0 H   (<10.0)  mg/L


 


Urine Appearance     (Clear)  


 


Urine Protein     (Negative)  


 


Urine Glucose (UA)     (Negative)  


 


Urine Ketones     (Negative)  


 


Urine Blood     (Negative)  


 


Ur Leukocyte Esterase     (Negative)  


 


Urine WBC     (0-5)  /hpf


 


Ur Squamous Epith Cells     (0-4)  /hpf


 


Amorphous Sediment     (None)  /hpf


 


Hyaline Casts     (0-2)  /lpf


 


Urine Mucus     (None)  /hpf














  11/22/20 Range/Units





  13:45 


 


WBC   (3.8-10.6)  k/uL


 


Neutrophils #   (1.3-7.7)  k/uL


 


Lymphocytes #   (1.0-4.8)  k/uL


 


Sodium   (137-145)  mmol/L


 


Chloride   ()  mmol/L


 


Glucose   (74-99)  mg/dL


 


Plasma Lactic Acid Jose Luis   (0.7-2.0)  mmol/L


 


C-Reactive Protein   (<10.0)  mg/L


 


Urine Appearance  Cloudy H  (Clear)  


 


Urine Protein  3+ H  (Negative)  


 


Urine Glucose (UA)  Trace H  (Negative)  


 


Urine Ketones  Trace H  (Negative)  


 


Urine Blood  Small H  (Negative)  


 


Ur Leukocyte Esterase  Trace H  (Negative)  


 


Urine WBC  17 H  (0-5)  /hpf


 


Ur Squamous Epith Cells  13 H  (0-4)  /hpf


 


Amorphous Sediment  Rare H  (None)  /hpf


 


Hyaline Casts  22 H  (0-2)  /lpf


 


Urine Mucus  Few H  (None)  /hpf








                      Microbiology - Last 24 Hours (Table)











 11/22/20 13:45 Urine Culture - Preliminary





 Urine,Voided 














Thrombosis Risk Factor Assmnt





- Choose All That Apply


Any of the Below Risk Factors Present?: No


Other Risk Factors: No


Each Risk Factor Represents 2 Points: Age 61-74 years


Other congenital or acquired thrombophilia - If yes, enter type in comment: No


Thrombosis Risk Factor Assessment Total Risk Factor Score: 2


Thrombosis Risk Factor Assessment Level: Very Low Risk

## 2020-11-23 NOTE — XR
EXAMINATION TYPE: XR chest 1V

 

DATE OF EXAM: 11/23/2020

 

CLINICAL HISTORY: pneumonia.

 

TECHNIQUE:  Portable frontal view of the chest.

 

COMPARISON: 11/22/2020 chest radiograph

 

FINDINGS:  Low lung volumes accentuates the cardiac mediastinal silhouette and lung markings. There a
re diffuse bilateral peripheral bandlike airspace opacities of the lungs, which are similar to 2 11/2
2/2020 given differences in lung volume. No pneumothorax. No pleural effusion.

 

IMPRESSION:  Bilateral peripheral bandlike airspace opacities characteristic for Covid 19 viral infec
tion. Findings are similar to 11/22/2020 and increased from 11/15/2020.

## 2020-11-23 NOTE — P.CNPUL
History of Present Illness


Consult date: 11/23/20


Requesting physician: Tc Friend


Reason for consult: other


Chief complaint: Nausea, vomiting and diarrhea


History of present illness: 


 This is a 75-year-old  female patient of Dr. Jose M Cano, with past

medical history of diabetes mellitus, who presented to the emergency department 

on 11/22/2024 evaluation of continuous nausea, vomiting and diarrhea, and 

patient also developed a fever.  Patient is a positive for COVID 19 1 week ago. 

She was seen in the emergency department for nausea and vomiting.  She received 

some IV hydration felt improvement, she has also abated a course of Levaquin for

a mild pneumonia.  Patient is a poor historian related to language barrier, we 

think her primary language is Occitan.  Most the history was obtained from the 

chart.  Patient was taken Zofran for nausea and vomiting, and felt some 

improvement last week however she ran out of Zofran and started vomiting again. 

Patient denied having any cough, shortness of breath or chest pain.  She was 

having intermittent diarrhea, denied any dysuria.  Denied any headaches, sore 

throat, no abdominal pain, just feeling nauseous and crampy.  She was febrile on

presentation to temp of 100F.  Pulse ox was 97% on room air.  Chest x-ray 

showing progression of bilateral infiltrates compared x completed on 11/15/2020.

 Lab work showed mild leukocytosis, with white blood cell count of 13.9, 

lymphopenia with the lymphocyte count of 0.6, sodium was 128, potassium 3.7, 

chloride was 89, renal profile was unremarkable, plasma lactic acid was mildly 

elevated at 3.2, LFTs were within normal limits.  LDH was 750, CRP was 256, down

to 201 on today's labs, urinalysis showed 3+ protein, trace ketones, small 

amount of blood, increased leukocytes and possibility of urinary tract 

infection, influenza screen was negative.  Patient was given a liter bolus in 

the emergency department, lactic acid has improved, and she continues on IV 

hydration with 0.9 normal saline at a rate of 75 ML per hour, she is on 

prophylactic dose of Lovenox, oral dexamethasone, and we will add Remdesivir to 

her regimen








Review of Systems


All systems: negative


Constitutional: Denies chills, Denies fever


Eyes: denies blurred vision, denies pain


Ears, nose, mouth and throat: Denies headache, Denies sore throat


Cardiovascular: Denies chest pain, Denies shortness of breath


Respiratory: Denies cough


Gastrointestinal: Reports diarrhea, Reports nausea, Reports vomiting, Denies 

abdominal pain


Genitourinary: Denies dysuria, Denies hematuria


Musculoskeletal: Denies myalgias


Integumentary: Denies pruritus, Denies rash


Neurological: Denies numbness, Denies weakness


Psychiatric: Denies anxiety, Denies depression


Endocrine: Denies fatigue, Denies weight change





Past Medical History


Past Medical History: Diabetes Mellitus


History of Any Multi-Drug Resistant Organisms: None Reported


Past Surgical History: No Surgical Hx Reported


Past Psychological History: No Psychological Hx Reported


Smoking Status: Never smoker


Past Alcohol Use History: None Reported


Past Drug Use History: None Reported





Medications and Allergies


                                Home Medications











 Medication  Instructions  Recorded  Confirmed  Type


 


Lisinopril-Hctz 10-12.5 mg 1 tab PO HS 11/17/20 11/22/20 History





[Zestoretic 10-12.5]    


 


Lovastatin [Mevacor] 20 mg PO HS 11/17/20 11/22/20 History


 


glyBURIDE/METFORMIN HCL 1 tab PO AC-BID 11/17/20 11/22/20 History





[Glucovance 5-500 mg]    








                                    Allergies











Allergy/AdvReac Type Severity Reaction Status Date / Time


 


No Known Allergies Allergy   Verified 11/22/20 15:44














Physical Exam


Vitals: 


                                   Vital Signs











  Temp Pulse Pulse Resp BP BP Pulse Ox


 


 11/23/20 07:00  98.3 F   67  17   118/64  90 L


 


 11/23/20 03:44  98.9 F   69  18   112/69  91 L


 


 11/22/20 22:55  98.4 F   69  18   116/67  94 L


 


 11/22/20 18:24  98.5 F  86   20  138/100   93 L


 


 11/22/20 15:01   81   16  131/79   95








                                Intake and Output











 11/22/20 11/23/20 11/23/20





 22:59 06:59 14:59


 


Intake Total  475 


 


Balance  475 


 


Intake:   


 


  Intake, IV Titration  475 





  Amount   


 


    Sodium Chloride 0.9% 1,  475 





    000 ml @ 75 mls/hr IV .   





    [id number] [facility] Rx#:[id number]   


 


Other:   


 


  Voiding Method   Toilet


 


  # Voids  1 


 


  Weight 65.771 kg  











 GENERAL EXAM: Alert, very pleasant, 75-year-old  female, on room air, 

with a pulse ox of 94%, comfortable in no apparent distress.


HEAD: Normocephalic/atraumatic.


EYES: Normal reaction of pupils, equal size.  Conjunctiva pink, sclera white.


NOSE: Clear with pink turbinates.


THROAT: No erythema or exudates.


NECK: No masses, no JVD, no thyroid enlargement, no adenopathy.


CHEST: No chest wall deformity.  Symmetrical expansion. 


LUNGS: Equal air entry with no crackles, wheeze, rhonchi or dullness.


CVS: Regular rate and rhythm, normal S1 and S2, no gallops, no murmurs, no rubs


ABDOMEN: Soft, nontender.  No hepatosplenomegaly, normal bowel sounds, no 

guarding or rigidity.


EXTREMITIES: No clubbing, no edema, no cyanosis, 2+ pulses and upper and lower 

extremities.


MUSCULOSKELETAL: Muscle strength and tone normal.


SPINE: No scoliosis or deformity


SKIN: No rashes


CENTRAL NERVOUS SYSTEM: Alert and oriented -3.  No focal deficits, tone is 

normal in all 4 extremities.


PSYCHIATRIC: Alert and oriented -3.  Appropriate affect.  Intact judgment and 

insight.











Results





- Laboratory Findings


CBC and BMP: 


                                 11/22/20 13:45





                                 11/22/20 13:45


Abnormal lab findings: 


                                  Abnormal Labs











  11/22/20 11/22/20 11/22/20





  13:45 13:45 13:45


 


WBC  13.9 H  


 


Neutrophils #  12.7 H  


 


Lymphocytes #  0.6 L  


 


Sodium   128 L 


 


Chloride   89 L 


 


Glucose   120 H 


 


Plasma Lactic Acid Jose Luis    3.2 H*


 


Lactate Dehydrogenase   


 


C-Reactive Protein   256.0 H 


 


Urine Appearance   


 


Urine Protein   


 


Urine Glucose (UA)   


 


Urine Ketones   


 


Urine Blood   


 


Ur Leukocyte Esterase   


 


Urine WBC   


 


Ur Squamous Epith Cells   


 


Amorphous Sediment   


 


Hyaline Casts   


 


Urine Mucus   














  11/22/20 11/23/20





  13:45 08:24


 


WBC  


 


Neutrophils #  


 


Lymphocytes #  


 


Sodium  


 


Chloride  


 


Glucose  


 


Plasma Lactic Acid Jose Luis  


 


Lactate Dehydrogenase   750 H


 


C-Reactive Protein   201.1 H


 


Urine Appearance  Cloudy H 


 


Urine Protein  3+ H 


 


Urine Glucose (UA)  Trace H 


 


Urine Ketones  Trace H 


 


Urine Blood  Small H 


 


Ur Leukocyte Esterase  Trace H 


 


Urine WBC  17 H 


 


Ur Squamous Epith Cells  13 H 


 


Amorphous Sediment  Rare H 


 


Hyaline Casts  22 H 


 


Urine Mucus  Few H 














- Diagnostic Findings


Chest x-ray: report reviewed, image reviewed


Additional studies: 


 EKG reviewed








Assessment and Plan


Plan: 


 Assessment:





#1.  Acute COVID 19 pneumonitis 





#2.  Nausea, vomiting, diarrhea related to acute COVID 19 infection





#3.  Hyponatremia, likely hypovolemic of related to protracted nausea vomiting 

and diarrhea





#4.  Mild lactic acidosis, improved with IV hydration





#5.  Possible urinary tract infection





#6.  Diabetes mellitus type 2





#7.  Increased inflammatory markers related to acute COVID 19 infection





Plan:





We'll send procalcitonin level, urine culture, continue with Decadron, continue 

vitamin C, zinc supplement, prophylactic dose of Lovenox, will add Remdesivir 

treatment, continue monitoring dyspnea, fever, oxygenation pattern, continue to 

follow





I performed a history & physical examination of the patient and discussed their 

management with my nurse practitioner, Justine Maciel.  I reviewed the nurse 

practitioner's note and agree with the documented findings and plan of care.  

Lung sounds are positive for bibasilar crackles.  The findings and the 

impression was discussed with the patient.  I attest to the documentation by the

nurse practitioner. 





Time with Patient: Greater than 30

## 2020-11-24 LAB
ALBUMIN SERPL-MCNC: 3.2 G/DL (ref 3.8–4.9)
ALBUMIN/GLOB SERPL: 1.39 G/DL (ref 1.6–3.17)
ALP SERPL-CCNC: 73 U/L (ref 41–126)
ALT SERPL-CCNC: 18 U/L (ref 8–44)
ANION GAP SERPL CALC-SCNC: 9 MMOL/L (ref 4–12)
AST SERPL-CCNC: 22 U/L (ref 13–35)
BASOPHILS # BLD AUTO: 0.1 K/UL (ref 0–0.2)
BASOPHILS NFR BLD AUTO: 1 %
BUN SERPL-SCNC: 15 MG/DL (ref 9–27)
BUN/CREAT SERPL: 21.43 RATIO (ref 12–20)
CALCIUM SPEC-MCNC: 8.4 MG/DL (ref 8.7–10.3)
CHLORIDE SERPL-SCNC: 101 MMOL/L (ref 96–109)
CK SERPL-CCNC: 49 U/L (ref 26–186)
CO2 SERPL-SCNC: 28 MMOL/L (ref 21.6–31.8)
EOSINOPHIL # BLD AUTO: 0.1 K/UL (ref 0–0.7)
EOSINOPHIL NFR BLD AUTO: 1 %
ERYTHROCYTE [DISTWIDTH] IN BLOOD BY AUTOMATED COUNT: 3.68 M/UL (ref 3.8–5.4)
ERYTHROCYTE [DISTWIDTH] IN BLOOD: 11.8 % (ref 11.5–15.5)
FERRITIN SERPL-MCNC: 398.1 NG/ML (ref 10–291)
GLOBULIN SER CALC-MCNC: 2.3 G/DL (ref 1.6–3.3)
GLUCOSE BLD-MCNC: 101 MG/DL (ref 75–99)
GLUCOSE BLD-MCNC: 113 MG/DL (ref 75–99)
GLUCOSE BLD-MCNC: 234 MG/DL (ref 75–99)
GLUCOSE BLD-MCNC: 240 MG/DL (ref 75–99)
GLUCOSE SERPL-MCNC: 127 MG/DL (ref 70–110)
GLUCOSE UR QL: (no result)
HBA1C MFR BLD: 7 % (ref 4–6)
HCT VFR BLD AUTO: 33.9 % (ref 34–46)
HGB BLD-MCNC: 11.4 GM/DL (ref 11.4–16)
LDH SPEC-CCNC: 296 U/L (ref 120–246)
LYMPHOCYTES # SPEC AUTO: 0.8 K/UL (ref 1–4.8)
LYMPHOCYTES NFR SPEC AUTO: 10 %
MCH RBC QN AUTO: 31 PG (ref 25–35)
MCHC RBC AUTO-ENTMCNC: 33.7 G/DL (ref 31–37)
MCV RBC AUTO: 92 FL (ref 80–100)
MONOCYTES # BLD AUTO: 0.4 K/UL (ref 0–1)
MONOCYTES NFR BLD AUTO: 5 %
NEUTROPHILS # BLD AUTO: 6.5 K/UL (ref 1.3–7.7)
NEUTROPHILS NFR BLD AUTO: 82 %
PH UR: 7 [PH] (ref 5–8)
PLATELET # BLD AUTO: 363 K/UL (ref 150–450)
POTASSIUM SERPL-SCNC: 4.3 MMOL/L (ref 3.5–5.5)
PROT SERPL-MCNC: 5.5 G/DL (ref 6.2–8.2)
RBC UR QL: 1 /HPF (ref 0–5)
SODIUM SERPL-SCNC: 138 MMOL/L (ref 135–145)
SP GR UR: 1.01 (ref 1–1.03)
SQUAMOUS UR QL AUTO: <1 /HPF (ref 0–4)
UROBILINOGEN UR QL STRIP: <2 MG/DL (ref ?–2)
WBC # BLD AUTO: 7.8 K/UL (ref 3.8–10.6)
WBC #/AREA URNS HPF: 4 /HPF (ref 0–5)

## 2020-11-24 RX ADMIN — PANTOPRAZOLE SODIUM SCH MG: 40 TABLET, DELAYED RELEASE ORAL at 09:40

## 2020-11-24 RX ADMIN — Medication SCH MG: at 09:40

## 2020-11-24 RX ADMIN — REMDESIVIR SCH MLS/HR: 100 INJECTION, POWDER, LYOPHILIZED, FOR SOLUTION INTRAVENOUS at 15:09

## 2020-11-24 RX ADMIN — CEFAZOLIN SCH: 330 INJECTION, POWDER, FOR SOLUTION INTRAMUSCULAR; INTRAVENOUS at 12:10

## 2020-11-24 RX ADMIN — ENOXAPARIN SODIUM SCH MG: 40 INJECTION SUBCUTANEOUS at 15:10

## 2020-11-24 RX ADMIN — INSULIN ASPART SCH UNIT: 100 INJECTION, SOLUTION INTRAVENOUS; SUBCUTANEOUS at 17:44

## 2020-11-24 RX ADMIN — INSULIN ASPART SCH: 100 INJECTION, SOLUTION INTRAVENOUS; SUBCUTANEOUS at 12:10

## 2020-11-24 RX ADMIN — INSULIN ASPART SCH: 100 INJECTION, SOLUTION INTRAVENOUS; SUBCUTANEOUS at 07:29

## 2020-11-24 RX ADMIN — INSULIN ASPART SCH UNIT: 100 INJECTION, SOLUTION INTRAVENOUS; SUBCUTANEOUS at 20:13

## 2020-11-24 RX ADMIN — ATORVASTATIN CALCIUM SCH MG: 10 TABLET, FILM COATED ORAL at 20:12

## 2020-11-24 RX ADMIN — DEXTROSE SCH MG: 50 INJECTION, SOLUTION INTRAVENOUS at 09:40

## 2020-11-24 RX ADMIN — Medication SCH UNIT: at 09:40

## 2020-11-24 RX ADMIN — CEFAZOLIN SCH MLS/HR: 330 INJECTION, POWDER, FOR SOLUTION INTRAMUSCULAR; INTRAVENOUS at 15:10

## 2020-11-24 RX ADMIN — OXYCODONE HYDROCHLORIDE AND ACETAMINOPHEN SCH MG: 500 TABLET ORAL at 09:40

## 2020-11-24 NOTE — P.PN
Subjective


Progress Note Date: 11/24/20


Principal diagnosis: 


 Acute COVID 19 pneumonitis


 This is a 75-year-old  female patient of Dr. Jose M Cano, with past

medical history of diabetes mellitus, who presented to the emergency department 

on 11/22/2024 evaluation of continuous nausea, vomiting and diarrhea, and 

patient also developed a fever.  Patient is a positive for COVID 19 1 week ago. 

She was seen in the emergency department for nausea and vomiting.  She received 

some IV hydration felt improvement, she has also abated a course of Levaquin for

a mild pneumonia.  Patient is a poor historian related to language barrier, we 

think her primary language is Citizen of [place].  Most the history was obtained from the 

chart.  Patient was taken Zofran for nausea and vomiting, and felt some 

improvement last week however she ran out of Zofran and started vomiting again. 

Patient denied having any cough, shortness of breath or chest pain.  She was 

having intermittent diarrhea, denied any dysuria.  Denied any headaches, sore 

throat, no abdominal pain, just feeling nauseous and crampy.  She was febrile on

presentation to temp of 100F.  Pulse ox was 97% on room air.  Chest x-ray 

showing progression of bilateral infiltrates compared x completed on 11/15/2020.

 Lab work showed mild leukocytosis, with white blood cell count of 13.9, 

lymphopenia with the lymphocyte count of 0.6, sodium was 128, potassium 3.7, ch

loride was 89, renal profile was unremarkable, plasma lactic acid was mildly 

elevated at 3.2, LFTs were within normal limits.  LDH was 750, CRP was 256, down

to 201 on today's labs, urinalysis showed 3+ protein, trace ketones, small 

amount of blood, increased leukocytes and possibility of urinary tract 

infection, influenza screen was negative.  Patient was given a liter bolus in 

the emergency department, lactic acid has improved, and she continues on IV 

hydration with 0.9 normal saline at a rate of 75 ML per hour, she is on 

prophylactic dose of Lovenox, oral dexamethasone, and we will add Remdesivir to 

her regimen





On 11/24/2020 patient seen in follow-up on esophageal medical surgical floor, 

she feels she is still short of breath, but appears to be in no acute distress, 

appears to be breathing comfortably at this time, he remains on room air, pulse 

ox is 92-93%, vital signs have been stable, she's been afebrile.  No cough.  No 

chest discomfort.  She is on day 2 of Remdesivir, she remains a prophylactic 

dose of Lovenox, and Decadron.  No acute events overnight, but remains weak 

overall.














Objective





- Vital Signs


Vital signs: 


                                   Vital Signs











Temp  98.3 F   11/24/20 12:46


 


Pulse  63   11/24/20 12:46


 


Resp  17   11/24/20 12:46


 


BP  144/73   11/24/20 12:46


 


Pulse Ox  92 L  11/24/20 12:46








                                 Intake & Output











 11/23/20 11/24/20 11/24/20





 18:59 06:59 18:59


 


Intake Total 600 300 850


 


Balance 600 300 850


 


Intake:   


 


  Intake, IV Titration 600  850





  Amount   


 


    Remdesivir (Eua) 100 mg   250





    In Sodium Chloride 0.9%   





    250 ml @ 250 mls/hr IVPB   





    DAILY@1400 [facility] Rx#:   





    [id number]   


 


    Sodium Chloride 0.9% 1, 600  600





    000 ml @ 75 mls/hr IV .   





    [id number] [facility] Rx#:[id number]   


 


  Oral  300 


 


Other:   


 


  Voiding Method Toilet Toilet 


 


  # Voids   5














- Exam


 GENERAL EXAM: Alert, very pleasant, 75-year-old  female, on room air, w

ith a pulse ox of 94%, comfortable in no apparent distress.


HEAD: Normocephalic/atraumatic.


EYES: Normal reaction of pupils, equal size.  Conjunctiva pink, sclera white.


NOSE: Clear with pink turbinates.


THROAT: No erythema or exudates.


NECK: No masses, no JVD, no thyroid enlargement, no adenopathy.


CHEST: No chest wall deformity.  Symmetrical expansion. 


LUNGS: Equal air entry with no crackles, wheeze, rhonchi or dullness.


CVS: Regular rate and rhythm, normal S1 and S2, no gallops, no murmurs, no rubs


ABDOMEN: Soft, nontender.  No hepatosplenomegaly, normal bowel sounds, no 

guarding or rigidity.


EXTREMITIES: No clubbing, no edema, no cyanosis, 2+ pulses and upper and lower 

extremities.


MUSCULOSKELETAL: Muscle strength and tone normal.


SPINE: No scoliosis or deformity


SKIN: No rashes


CENTRAL NERVOUS SYSTEM: Alert and oriented -3.  No focal deficits, tone is 

normal in all 4 extremities.


PSYCHIATRIC: Alert and oriented -3.  Appropriate affect.  Intact judgment and 

insight.








- Labs


CBC & Chem 7: 


                                 11/24/20 06:19





                                 11/24/20 06:19


Labs: 


                  Abnormal Lab Results - Last 24 Hours (Table)











  11/23/20 11/23/20 11/24/20 Range/Units





  08:24 19:26 06:19 


 


RBC     (3.80-5.40)  m/uL


 


Hct     (34.0-46.0)  %


 


Lymphocytes #     (1.0-4.8)  k/uL


 


BUN/Creatinine Ratio    21.43 H  (12.00-20.00)  Ratio


 


Glucose    127 H  ()  mg/dL


 


POC Glucose (mg/dL)   247 H   (75-99)  mg/dL


 


Calcium    8.4 L  (8.7-10.3)  mg/dL


 


Ferritin    398.1 H  (10.0-291.0)  ng/mL


 


Lactate Dehydrogenase    296 H  (120-246)  U/L


 


C-Reactive Protein    9.2 H  (0.0-0.8)  mg/dL


 


Total Protein    5.5 L  (6.2-8.2)  g/dL


 


Albumin    3.20 L  (3.80-4.90)  g/dL


 


Albumin/Globulin Ratio    1.39 L  (1.60-3.17)  g/dL


 


Procalcitonin  0.18 H    (0.02-0.09)  ng/mL


 


Urine Protein     (Negative)  


 


Urine Glucose (UA)     (Negative)  


 


Ur Leukocyte Esterase     (Negative)  


 


Urine Bacteria     (None)  /hpf


 


Urine Mucus     (None)  /hpf














  11/24/20 11/24/20 11/24/20 Range/Units





  06:19 07:05 11:46 


 


RBC  3.68 L    (3.80-5.40)  m/uL


 


Hct  33.9 L    (34.0-46.0)  %


 


Lymphocytes #  0.8 L    (1.0-4.8)  k/uL


 


BUN/Creatinine Ratio     (12.00-20.00)  Ratio


 


Glucose     ()  mg/dL


 


POC Glucose (mg/dL)   101 H  113 H  (75-99)  mg/dL


 


Calcium     (8.7-10.3)  mg/dL


 


Ferritin     (10.0-291.0)  ng/mL


 


Lactate Dehydrogenase     (120-246)  U/L


 


C-Reactive Protein     (0.0-0.8)  mg/dL


 


Total Protein     (6.2-8.2)  g/dL


 


Albumin     (3.80-4.90)  g/dL


 


Albumin/Globulin Ratio     (1.60-3.17)  g/dL


 


Procalcitonin     (0.02-0.09)  ng/mL


 


Urine Protein     (Negative)  


 


Urine Glucose (UA)     (Negative)  


 


Ur Leukocyte Esterase     (Negative)  


 


Urine Bacteria     (None)  /hpf


 


Urine Mucus     (None)  /hpf














  11/24/20 11/24/20 Range/Units





  15:39 17:14 


 


RBC    (3.80-5.40)  m/uL


 


Hct    (34.0-46.0)  %


 


Lymphocytes #    (1.0-4.8)  k/uL


 


BUN/Creatinine Ratio    (12.00-20.00)  Ratio


 


Glucose    ()  mg/dL


 


POC Glucose (mg/dL)   234 H  (75-99)  mg/dL


 


Calcium    (8.7-10.3)  mg/dL


 


Ferritin    (10.0-291.0)  ng/mL


 


Lactate Dehydrogenase    (120-246)  U/L


 


C-Reactive Protein    (0.0-0.8)  mg/dL


 


Total Protein    (6.2-8.2)  g/dL


 


Albumin    (3.80-4.90)  g/dL


 


Albumin/Globulin Ratio    (1.60-3.17)  g/dL


 


Procalcitonin    (0.02-0.09)  ng/mL


 


Urine Protein  Trace H   (Negative)  


 


Urine Glucose (UA)  2+ H   (Negative)  


 


Ur Leukocyte Esterase  Small H   (Negative)  


 


Urine Bacteria  Rare H   (None)  /hpf


 


Urine Mucus  Rare H   (None)  /hpf








                      Microbiology - Last 24 Hours (Table)











 11/22/20 13:45 Blood Culture - Preliminary





 Blood    No Growth after 48 hours


 


 11/22/20 13:45 Urine Culture - Final





 Urine,Voided    Candida albicans














Assessment and Plan


Plan: 


 Assessment:





#1.  Acute COVID 19 pneumonitis 





#2.  Nausea, vomiting, diarrhea related to acute COVID 19 infection, improved





#3.  Hyponatremia, likely hypovolemic of related to protracted nausea vomiting 

and diarrhea, improved





#4.  Mild lactic acidosis, improved with IV hydration





#5.  Possible urinary tract infection





#6.  Diabetes mellitus type 2





#7.  Increased inflammatory markers related to acute COVID 19 infection





Plan:





Patient remains weak, she still feels short of breath, but appears to be in no 

acute distress, vital signs have been stable, will continue with the Remdesivir 

course, today is her second day of treatment, she remains on room air, and acute

worsening in her oxygenation pattern, her nausea and vomiting have improved, 

hyponatremia improved, continue with gentle IV hydration.  We'll continue to 

follow the patient, will continue Remdesivir course.  Follow-up chest x-ray in 

the morning





I performed a history & physical examination of the patient and discussed their 

management with my nurse practitioner, Justine Maciel.  I reviewed the nurse 

practitioner's note and agree with the documented findings and plan of care.  

Lung sounds are positive for bibasilar crackles.  The findings and the 

impression was discussed with the patient.  I attest to the documentation by the

nurse practitioner. 





Time with Patient: Less than 30

## 2020-11-24 NOTE — P.PN
Subjective


Progress Note Date: 11/24/20





HISTORY OF PRESENT ILLNESS


This is a 75-year-old  female patient of Dr. Jose M Cano with past 

medical history of hypertension, hyperlipidemia, diabetes mellitus type 2.  She 

initially presented to [facility] emergency center on November 

15 for generalized weakness and fever and cough for 3 days.  She was diagnosed 

with UTI and pneumonia and discharged on Levaquin for 7 day course.  She had: 19

testing at that time which was not back at the time of discharge.  Patient 

returned on November 17 with complaints of nausea and vomiting and was discharg

ed home on Zofran.  Patient apparently ran out of Zofran yesterday and presented

again to [facility] emergency center with nausea, vomiting and 

fever. She denies having any cough, shortness of breath or chest pain.  She is 

still having intermittent diarrhea.  No abdominal pain. Patient presented to 

[facility] emergency center, temperature max 100.0, heart rate 

69, blood pressure 116/67, pulse ox 94% on room air.  This morning pulse ox is 

90% on room air.  WBC 13.9, sodium 128, potassium 3.7, creatinine 0.63.  C-

reactive protein 256.  Urinalysis cloudy, blood small, WBCs 17, squamous cells 

13.Influenza A and B negative on November 22 and November 15.  Chest x-ray done 

yesterday reveals progression of bilateral infiltrates.  Patient was admitted to

the MedSurg floor, started on IV fluids, Lovenox, dexamethasone, vitamin D, 

vitamin C, zinc.  Consult dated for pulmonary medicine.  Patient has history of 

smoking.  Information is limited from the patient due to limited English.





11/24: Patient does not have any abdominal pain.  No nausea no vomiting no johnie

rrhea.  She is walking to the bathroom and does not require oxygen.  Cardiac 

monitor has been in normal sinus rhythm.  She was seen yesterday by phone or 

medicine and started on Remdesivir.  She is on day #2/5 of Remdesivir.  Repeat 

blood work reveals WBC 7.8, hemoglobin 11.4, lymphocytes 0.8.  Ferritin improved

at 398, LDH improved at 296, C-reactive protein down to 9.2.  Afebrile, heart 

rate 68, blood pressure 126/67 pulse ox 93% on room air.  Patient appears to be 

quite stable and appropriate for discharge.  We'll plan for discharge home today

if cleared by pulmonary medicine.





REVIEW OF SYSTEMS


Constitutional: Denies fever, Denies chills, Denies night sweats.  No weight 

change.  Denies weakness, denies daytime sleepiness.


EENT: No headache.  No blurred vision or double vision, no loss of vision.  No 

loss of Hearing, no ringing in the ears, no dizziness.  No nasal drainage or 

congestion.  No epistaxis.  No sore throat.


Lungs: No shortness of breath, cough, no sputum production.  No wheezing.


Cardiovascular: No chest pain, no lower extremity edema.  No palpitations.  No 

paroxysmal nocturnal dyspnea.  No orthopnea.  No lightheadedness or dizziness.  

No syncopal episodes.


Abdominal: Denies abdominal pain.  Denies nausea, Denies vomiting.  Denies 

diarrhea.  No constipation.  No bloody or tarry stools. Denies loss of appetite.


Genitourinary: No dysuria, increased frequency, urgency.  


Musculoskeletal: No myalgias.  No muscle weakness, no gait dysfunction, no 

frequent falls.  


Integumentary: No wounds, no lesions.  No rash or pruritus.  


Neurologic: No aphasia. No facial droop. No change in mentation. No head injury.

No headache. No paralysis. No paresthesia.


Psychiatric: No depression.  No anxiety.  


Endocrine: No abnormal blood sugars.  





PHYSICAL EXAMINATION


Gen: This is a 75-year-old female.  She is resting in bed and appears to be 

comfortable and in no acute distress.  No respiratory distress is noted.


HEENT: Head is atraumatic, normocephalic. Pupils equal, round. Sclerae is 

anicteric. 


NECK: Supple. No JVD. No lymphadenopathy. No thyromegaly. 


LUNGS: Clear to auscultation. No wheezes or rhonchi.  No intercostal 

retractions.  No accessory muscle usage.


HEART: Regular rate and rhythm. No murmur. 


ABDOMEN: Soft. Bowel sounds are present. No masses.  No tenderness.


EXTREMITIES: No pedal edema.  No calf tenderness.


NEUROLOGICAL: Patient is awake, alert and oriented x3. Cranial nerves 2 through 

12 are grossly intact. 





ASSESSMENT AND PLAN


1.  Covid 19 pneumonia.  Continue dexamethasone 6 mg IV daily, Lovenox 40 mg 

daily, zinc, vitamin C, vitamin D, consult with pulmonary medicine.  Repeat 

chest x-ray today, blood culture, sputum culture.  Patient has been started on 

Remdesivir.





2.  Intractable nausea and vomiting.  Continue Zofran 4 mg IV every 6 hours as 

needed, IV fluids or 0.9 normal saline at 75 mL per hour.





3.  Dehydration with hyponatremia.  Continue IV fluids.





4.  Hypertension.  Hold hydrochlorothiazide, resume lisinopril 10 mg daily with 

parameters.





5.  Hyperlipidemia.  Continue lovastatin 20 mg at bedtime.





6.  Diabetes mellitus type 2, uncontrolled with hypoglycemia.  Hold glyburide 

and metformin.  NovoLog scale only for now.  Hemoglobin A1c.





7.  GI prophylaxis.  Protonix.





8.  DVT prophylaxis.  Lovenox.





DISCHARGE PLAN


Most likely return home.





Impression and plan of care have been directed as dictated by the signing 

physician.  Alma Gardner nurse practitioner acting as scribe for signing 

physician.





Objective





- Vital Signs


Vital signs: 


                                   Vital Signs











Temp  98.4 F   11/24/20 04:44


 


Pulse  68   11/24/20 04:44


 


Resp  20   11/24/20 04:44


 


BP  126/67   11/24/20 04:44


 


Pulse Ox  93 L  11/24/20 04:44








                                 Intake & Output











 11/23/20 11/24/20 11/24/20





 18:59 06:59 18:59


 


Intake Total 600 300 


 


Balance 600 300 


 


Intake:   


 


  Intake, IV Titration 600  





  Amount   


 


    Sodium Chloride 0.9% 1, 600  





    000 ml @ 75 mls/hr IV .   





    [id number] [facility] Rx#:[id number]   


 


  Oral  300 


 


Other:   


 


  Voiding Method Toilet Toilet 














- Labs


CBC & Chem 7: 


                                 11/24/20 06:19





                                 11/24/20 06:19


Labs: 


                  Abnormal Lab Results - Last 24 Hours (Table)











  11/23/20 11/23/20 11/23/20 Range/Units





  08:24 08:24 17:28 


 


RBC     (3.80-5.40)  m/uL


 


Hct     (34.0-46.0)  %


 


Lymphocytes #     (1.0-4.8)  k/uL


 


POC Glucose (mg/dL)    197 H  (75-99)  mg/dL


 


Ferritin  464.1 H    (10.0-291.0)  ng/mL


 


Lactate Dehydrogenase  750 H    (313-618)  U/L


 


C-Reactive Protein  201.1 H    (<10.0)  mg/L


 


Procalcitonin   0.18 H   (0.02-0.09)  ng/mL














  11/23/20 11/24/20 11/24/20 Range/Units





  19:26 06:19 07:05 


 


RBC   3.68 L   (3.80-5.40)  m/uL


 


Hct   33.9 L   (34.0-46.0)  %


 


Lymphocytes #   0.8 L   (1.0-4.8)  k/uL


 


POC Glucose (mg/dL)  247 H   101 H  (75-99)  mg/dL


 


Ferritin     (10.0-291.0)  ng/mL


 


Lactate Dehydrogenase     (313-618)  U/L


 


C-Reactive Protein     (<10.0)  mg/L


 


Procalcitonin     (0.02-0.09)  ng/mL








                      Microbiology - Last 24 Hours (Table)











 11/22/20 13:45 Urine Culture - Final





 Urine,Voided    Candida albicans


 


 11/22/20 13:45 Blood Culture - Preliminary





 Blood    No Growth after 24 hours

## 2020-11-24 NOTE — US
EXAMINATION TYPE: US pelvic limited

 

DATE OF EXAM: 11/24/2020

 

COMPARISON: NONE

 

CLINICAL HISTORY: dysuria.

 

Inpatient who doesn't speak English. She has a UTI and dysuria, patient  has extensive midline scarri
ng. She is unable to well hold her bladder and has urgency. 

 

Bladder appears wnl as seen. 

 

 

 

Urinary bladder is sonolucent.

There is no evidence of free fluid in the pelvis. Uterus is not definitely seen. There is no sign of 
a pelvic mass.

IMPRESSION:  No demonstrated abnormality.

## 2020-11-24 NOTE — P.DS
Providers


Date of admission: 


11/22/20 14:52





Expected date of discharge: 11/24/20


Attending physician: 


Temi Nicole





Consults: 





                                        





11/23/20 07:58


Consult Physician Routine 


   Consulting Provider: Geoff Baker


   Consult Reason/Comments: covid


   Do you want consulting provider notified?: Yes











Primary care physician: 


Jose M Cano





[facility] Course: 





HISTORY OF PRESENT ILLNESS


This is a 75-year-old  female patient of Dr. Jose M Cano with past 

medical history of hypertension, hyperlipidemia, diabetes mellitus type 2.  She 

initially presented to [facility] emergency center on November 

15 for generalized weakness and fever and cough for 3 days.  She was diagnosed 

with UTI and pneumonia and discharged on Levaquin for 7 day course.  She had: 19

testing at that time which was not back at the time of discharge.  Patient 

returned on November 17 with complaints of nausea and vomiting and was 

discharged home on Zofran.  Patient apparently ran out of Zofran yesterday and 

presented again to [facility] emergency center with nausea, 

vomiting and fever. She denies having any cough, shortness of breath or chest 

pain.  She is still having intermittent diarrhea.  No abdominal pain. Patient 

presented to [facility] emergency center, temperature max 

100.0, heart rate 69, blood pressure 116/67, pulse ox 94% on room air.  This 

morning pulse ox is 90% on room air.  WBC 13.9, sodium 128, potassium 3.7, 

creatinine 0.63.  C-reactive protein 256.  Urinalysis cloudy, blood small, WBCs 

17, squamous cells 13.Influenza A and B negative on November 22 and November 15.

 Chest x-ray done yesterday reveals progression of bilateral infiltrates.  

Patient was admitted to the MedSurg floor, started on IV fluids, Lovenox, 

dexamethasone, vitamin D, vitamin C, zinc.  Consult dated for pulmonary 

medicine.  Patient has history of smoking.  Information is limited from the 

patient due to limited English.





11/24: Patient does not have any abdominal pain.  No nausea no vomiting no 

diarrhea.  She is walking to the bathroom and does not require oxygen.  Cardiac 

monitor has been in normal sinus rhythm.  She was seen yesterday by phone or 

medicine and started on Remdesivir.  She is on day #2/5 of Remdesivir.  Repeat 

blood work reveals WBC 7.8, hemoglobin 11.4, lymphocytes 0.8.  Ferritin improved

at 398, LDH improved at 296, C-reactive protein down to 9.2.  Afebrile, heart 

rate 68, blood pressure 126/67 pulse ox 93% on room air.  Patient appears to be 

quite stable and appropriate for discharge.  We'll plan for discharge home today

if cleared by pulmonary medicine.





ASSESSMENT AND PLAN


1.  Covid 19 pneumonia.  


2.  Intractable nausea and vomiting, resolved. 


3.  Dehydration with hyponatremia. 


4.  Hypertension.  


5.  Hyperlipidemia.  


6.  Diabetes mellitus type 2, uncontrolled with hypoglycemia. 





DISCHARGE PLAN


Home.





Impression and plan of care have been directed as dictated by the signing 

physician.  Alma Gardner nurse practitioner acting as scribe for signing 

physician.


Patient Condition at Discharge: Good





Plan - Discharge Summary


Discharge Rx Participant: No


New Discharge Prescriptions: 


New


   Dexamethasone 6 mg PO DAILY #5 tablet


   Zinc Sulfate [Orazinc] 220 mg PO DAILY  cap


   Pantoprazole [Protonix] 40 mg PO AC-BRKFST #30 tablet.


   Acetaminophen Tab [Tylenol] 650 mg PO Q6HR PRN  tab


     PRN Reason: Fever And/ Or Pain


   Ascorbic Acid [Vitamin C] 1,000 mg PO DAILY  tab


   Cholecalciferol [Vitamin D3 (25 Mcg = 1000 Iu)] 2,000 unit PO DAILY  tab


   Ondansetron [Zofran] 4 mg PO Q8HR PRN #20 tab


     PRN Reason: Nausea





Continue


   glyBURIDE/METFORMIN HCL [Glucovance 5-500 mg] 1 tab PO AC-BID


   Lovastatin [Mevacor] 20 mg PO HS


   Lisinopril-Hctz 10-12.5 mg [Zestoretic 10-12.5] 1 tab PO HS


Discharge Medication List





Lisinopril-Hctz 10-12.5 mg [Zestoretic 10-12.5] 1 tab PO HS 11/17/20 [History]


Lovastatin [Mevacor] 20 mg PO HS 11/17/20 [History]


glyBURIDE/METFORMIN HCL [Glucovance 5-500 mg] 1 tab PO AC-BID 11/17/20 [History]


Acetaminophen Tab [Tylenol] 650 mg PO Q6HR PRN  tab 11/24/20 [Rx]


Ascorbic Acid [Vitamin C] 1,000 mg PO DAILY  tab 11/24/20 [Rx]


Cholecalciferol [Vitamin D3 (25 Mcg = 1000 Iu)] 2,000 unit PO DAILY  tab 

11/24/20 [Rx]


Dexamethasone 6 mg PO DAILY #5 tablet 11/24/20 [Rx]


Ondansetron [Zofran] 4 mg PO Q8HR PRN #20 tab 11/24/20 [Rx]


Pantoprazole [Protonix] 40 mg PO AC-BRKFST #30 tablet.dr 11/24/20 [Rx]


Zinc Sulfate [Orazinc] 220 mg PO DAILY  cap 11/24/20 [Rx]








Follow up Appointment(s)/Referral(s): 


Jose M Cano MD [Primary Care Provider] - 1 Week

## 2020-11-25 VITALS
RESPIRATION RATE: 17 BRPM | DIASTOLIC BLOOD PRESSURE: 67 MMHG | TEMPERATURE: 97.8 F | SYSTOLIC BLOOD PRESSURE: 136 MMHG | HEART RATE: 64 BPM

## 2020-11-25 LAB
ALBUMIN SERPL-MCNC: 3.2 G/DL (ref 3.8–4.9)
ALBUMIN/GLOB SERPL: 1.52 G/DL (ref 1.6–3.17)
ALP SERPL-CCNC: 71 U/L (ref 41–126)
ALT SERPL-CCNC: 20 U/L (ref 8–44)
ANION GAP SERPL CALC-SCNC: 9.2 MMOL/L (ref 4–12)
AST SERPL-CCNC: 20 U/L (ref 13–35)
BASOPHILS # BLD AUTO: 0 K/UL (ref 0–0.2)
BASOPHILS NFR BLD AUTO: 0 %
BUN SERPL-SCNC: 14 MG/DL (ref 9–27)
BUN/CREAT SERPL: 23.33 RATIO (ref 12–20)
CALCIUM SPEC-MCNC: 8.4 MG/DL (ref 8.7–10.3)
CHLORIDE SERPL-SCNC: 101 MMOL/L (ref 96–109)
CO2 SERPL-SCNC: 27.8 MMOL/L (ref 21.6–31.8)
EOSINOPHIL # BLD AUTO: 0 K/UL (ref 0–0.7)
EOSINOPHIL NFR BLD AUTO: 0 %
ERYTHROCYTE [DISTWIDTH] IN BLOOD BY AUTOMATED COUNT: 3.71 M/UL (ref 3.8–5.4)
ERYTHROCYTE [DISTWIDTH] IN BLOOD: 11.9 % (ref 11.5–15.5)
GLOBULIN SER CALC-MCNC: 2.1 G/DL (ref 1.6–3.3)
GLUCOSE BLD-MCNC: 219 MG/DL (ref 75–99)
GLUCOSE BLD-MCNC: 70 MG/DL (ref 75–99)
GLUCOSE SERPL-MCNC: 82 MG/DL (ref 70–110)
HCT VFR BLD AUTO: 34.2 % (ref 34–46)
HGB BLD-MCNC: 11.4 GM/DL (ref 11.4–16)
LDH SPEC-CCNC: 333 U/L (ref 120–246)
LYMPHOCYTES # SPEC AUTO: 1.5 K/UL (ref 1–4.8)
LYMPHOCYTES NFR SPEC AUTO: 13 %
MCH RBC QN AUTO: 30.8 PG (ref 25–35)
MCHC RBC AUTO-ENTMCNC: 33.4 G/DL (ref 31–37)
MCV RBC AUTO: 92.3 FL (ref 80–100)
MONOCYTES # BLD AUTO: 0.6 K/UL (ref 0–1)
MONOCYTES NFR BLD AUTO: 5 %
NEUTROPHILS # BLD AUTO: 9.5 K/UL (ref 1.3–7.7)
NEUTROPHILS NFR BLD AUTO: 80 %
PLATELET # BLD AUTO: 423 K/UL (ref 150–450)
POTASSIUM SERPL-SCNC: 4.3 MMOL/L (ref 3.5–5.5)
PROT SERPL-MCNC: 5.3 G/DL (ref 6.2–8.2)
SODIUM SERPL-SCNC: 138 MMOL/L (ref 135–145)
WBC # BLD AUTO: 11.9 K/UL (ref 3.8–10.6)

## 2020-11-25 RX ADMIN — Medication SCH UNIT: at 08:03

## 2020-11-25 RX ADMIN — INSULIN ASPART SCH: 100 INJECTION, SOLUTION INTRAVENOUS; SUBCUTANEOUS at 07:15

## 2020-11-25 RX ADMIN — CEFAZOLIN SCH: 330 INJECTION, POWDER, FOR SOLUTION INTRAMUSCULAR; INTRAVENOUS at 12:54

## 2020-11-25 RX ADMIN — INSULIN ASPART SCH UNIT: 100 INJECTION, SOLUTION INTRAVENOUS; SUBCUTANEOUS at 13:06

## 2020-11-25 RX ADMIN — OXYCODONE HYDROCHLORIDE AND ACETAMINOPHEN SCH MG: 500 TABLET ORAL at 08:03

## 2020-11-25 RX ADMIN — DEXTROSE SCH MG: 50 INJECTION, SOLUTION INTRAVENOUS at 08:03

## 2020-11-25 RX ADMIN — Medication SCH MG: at 08:03

## 2020-11-25 RX ADMIN — PANTOPRAZOLE SODIUM SCH MG: 40 TABLET, DELAYED RELEASE ORAL at 08:03

## 2020-11-25 RX ADMIN — REMDESIVIR SCH MLS/HR: 100 INJECTION, POWDER, LYOPHILIZED, FOR SOLUTION INTRAVENOUS at 13:05

## 2020-11-25 NOTE — P.PN
Subjective


Progress Note Date: 11/25/20





HISTORY OF PRESENT ILLNESS


This is a 75-year-old  female patient of Dr. Jose M Cano with past 

medical history of hypertension, hyperlipidemia, diabetes mellitus type 2.  She 

initially presented to [facility] emergency center on November 

15 for generalized weakness and fever and cough for 3 days.  She was diagnosed 

with UTI and pneumonia and discharged on Levaquin for 7 day course.  She had: 19

testing at that time which was not back at the time of discharge.  Patient 

returned on November 17 with complaints of nausea and vomiting and was discharg

ed home on Zofran.  Patient apparently ran out of Zofran yesterday and presented

again to [facility] emergency center with nausea, vomiting and 

fever. She denies having any cough, shortness of breath or chest pain.  She is 

still having intermittent diarrhea.  No abdominal pain. Patient presented to 

[facility] emergency center, temperature max 100.0, heart rate 

69, blood pressure 116/67, pulse ox 94% on room air.  This morning pulse ox is 

90% on room air.  WBC 13.9, sodium 128, potassium 3.7, creatinine 0.63.  C-

reactive protein 256.  Urinalysis cloudy, blood small, WBCs 17, squamous cells 

13.Influenza A and B negative on November 22 and November 15.  Chest x-ray done 

yesterday reveals progression of bilateral infiltrates.  Patient was admitted to

the MedSurg floor, started on IV fluids, Lovenox, dexamethasone, vitamin D, 

vitamin C, zinc.  Consult dated for pulmonary medicine.  Patient has history of 

smoking.  Information is limited from the patient due to limited English.





11/24: Patient does not have any abdominal pain.  No nausea no vomiting no johnie

rrhea.  She is walking to the bathroom and does not require oxygen.  Cardiac 

monitor has been in normal sinus rhythm.  She was seen yesterday by phone or 

medicine and started on Remdesivir.  She is on day #2/5 of Remdesivir.  Repeat 

blood work reveals WBC 7.8, hemoglobin 11.4, lymphocytes 0.8.  Ferritin improved

at 398, LDH improved at 296, C-reactive protein down to 9.2.  Afebrile, heart 

rate 68, blood pressure 126/67 pulse ox 93% on room air.  Patient appears to be 

quite stable and appropriate for discharge.  We'll plan for discharge home today

if cleared by pulmonary medicine.





11/25: She remains afebrile, heart rate 70, blood pressure 1 3475, pulse ox 91% 

on room air.  She is on day #3/5 of Remdesivir.  Patient is complaining of more 

shortness of breath today. Pelvic ultrasound showed no abnormality.  Sputum 

cultures collected.  Blood culture no growth at 48 hours.  Urine is positive for

Candida.  D-dimer 1.1, blood sugar 7.0.  CBC 11.9, hemoglobin 11.4.  

Electrolytes and renal function are normal.  Blood sugar 82.  , C-

reactive protein 5.1.  IV fluids will be discontinued.





REVIEW OF SYSTEMS


Constitutional: Denies fever, Denies chills, Denies night sweats.  No weight 

change.  Denies weakness, denies daytime sleepiness.


EENT: No headache.  No blurred vision or double vision, no loss of vision.  No 

loss of Hearing, no ringing in the ears, no dizziness.  No nasal drainage or 

congestion.  No epistaxis.  No sore throat.


Lungs: No shortness of breath, cough, no sputum production.  No wheezing.


Cardiovascular: No chest pain, no lower extremity edema.  No palpitations.  No 

paroxysmal nocturnal dyspnea.  No orthopnea.  No lightheadedness or dizziness.  

No syncopal episodes.


Abdominal: Denies abdominal pain.  Denies nausea, Denies vomiting.  Denies 

diarrhea.  No constipation.  No bloody or tarry stools. Denies loss of appetite.


Genitourinary: No dysuria, increased frequency, urgency.  


Musculoskeletal: No myalgias.  No muscle weakness, no gait dysfunction, no 

frequent falls.  


Integumentary: No wounds, no lesions.  No rash or pruritus.  


Neurologic: No aphasia. No facial droop. No change in mentation. No head injury.

No headache. No paralysis. No paresthesia.


Psychiatric: No depression.  No anxiety.  


Endocrine: No abnormal blood sugars.  





PHYSICAL EXAMINATION


Gen: This is a 75-year-old female.  She is resting in bed and appears to be 

comfortable and in no acute distress.  No respiratory distress is noted.


HEENT: Head is atraumatic, normocephalic. Pupils equal, round. Sclerae is 

anicteric. 


NECK: Supple. No JVD. No lymphadenopathy. No thyromegaly. 


LUNGS: Clear to auscultation. No wheezes or rhonchi.  No intercostal 

retractions.  No accessory muscle usage.


HEART: Regular rate and rhythm. No murmur. 


ABDOMEN: Soft. Bowel sounds are present. No masses.  No tenderness.


EXTREMITIES: No pedal edema.  No calf tenderness.


NEUROLOGICAL: Patient is awake, alert and oriented x3. Cranial nerves 2 through 

12 are grossly intact. 





ASSESSMENT AND PLAN


1.  Covid 19 pneumonia.  Continue dexamethasone 6 mg IV daily, Lovenox 40 mg 

daily, zinc, vitamin C, vitamin D, consult with pulmonary medicine.  Repeat 

chest x-ray, blood culture, sputum culture.  Patient is on day #3/5 of Remdes

ivir.





2.  Intractable nausea and vomiting, resolved.  Continue Zofran 4 mg IV every 6 

hours as needed, IV fluids discontinued.





3.  Dehydration with hyponatremia, resolved.  Continue IV fluids.





4.  Hypertension.  Hold hydrochlorothiazide, resume lisinopril 10 mg daily with 

parameters.





5.  Hyperlipidemia.  Continue lovastatin 20 mg at bedtime.





6.  Diabetes mellitus type 2, uncontrolled with hypoglycemia.  Hold glyburide 

and metformin.  NovoLog scale only for now.  Hemoglobin A1c.





7.  GI prophylaxis.  Protonix.





8.  DVT prophylaxis.  Lovenox.





DISCHARGE PLAN


Most likely return home on Friday after completing Remdesivir.





Impression and plan of care have been directed as dictated by the signing 

physician.  Alma Gardner nurse practitioner acting as scribe for signing 

physician.





Objective





- Vital Signs


Vital signs: 


                                   Vital Signs











Temp  97.4 F L  11/25/20 05:40


 


Pulse  70   11/25/20 05:40


 


Resp  16   11/25/20 05:40


 


BP  134/75   11/25/20 05:40


 


Pulse Ox  91 L  11/25/20 05:40








                                 Intake & Output











 11/24/20 11/25/20 11/25/20





 18:59 06:59 18:59


 


Intake Total 850 1015 


 


Balance 850 1015 


 


Intake:   


 


  Intake, IV Titration 850 725 





  Amount   


 


    Remdesivir (Eua) 100 mg 250  





    In Sodium Chloride 0.9%   





    250 ml @ 250 mls/hr IVPB   





    DAILY@1400 [facility] Rx#:   





    [id number]   


 


    Sodium Chloride 0.9% 1, 600 725 





    000 ml @ 75 mls/hr IV .   





    [id number] [facility] Rx#:[id number]   


 


  Oral  290 


 


Other:   


 


  Voiding Method  Toilet 


 


  # Voids 5 4 


 


  # Bowel Movements  0 














- Labs


CBC & Chem 7: 


                                 11/25/20 05:34





                                 11/25/20 05:34


Labs: 


                  Abnormal Lab Results - Last 24 Hours (Table)











  11/24/20 11/24/20 11/24/20 Range/Units





  06:19 06:19 11:46 


 


WBC     (3.8-10.6)  k/uL


 


RBC     (3.80-5.40)  m/uL


 


Neutrophils #     (1.3-7.7)  k/uL


 


D-Dimer     (<0.60)  mg/L FEU


 


BUN/Creatinine Ratio   21.43 H   (12.00-20.00)  Ratio


 


Glucose   127 H   ()  mg/dL


 


POC Glucose (mg/dL)    113 H  (75-99)  mg/dL


 


Hemoglobin A1c  7.0 H    (4.0-6.0)  %


 


Calcium   8.4 L   (8.7-10.3)  mg/dL


 


Ferritin   398.1 H   (10.0-291.0)  ng/mL


 


Lactate Dehydrogenase   296 H   (120-246)  U/L


 


C-Reactive Protein   9.2 H   (0.0-0.8)  mg/dL


 


Total Protein   5.5 L   (6.2-8.2)  g/dL


 


Albumin   3.20 L   (3.80-4.90)  g/dL


 


Albumin/Globulin Ratio   1.39 L   (1.60-3.17)  g/dL


 


Urine Protein     (Negative)  


 


Urine Glucose (UA)     (Negative)  


 


Ur Leukocyte Esterase     (Negative)  


 


Urine Bacteria     (None)  /hpf


 


Urine Mucus     (None)  /hpf














  11/24/20 11/24/20 11/24/20 Range/Units





  15:39 17:14 19:49 


 


WBC     (3.8-10.6)  k/uL


 


RBC     (3.80-5.40)  m/uL


 


Neutrophils #     (1.3-7.7)  k/uL


 


D-Dimer     (<0.60)  mg/L FEU


 


BUN/Creatinine Ratio     (12.00-20.00)  Ratio


 


Glucose     ()  mg/dL


 


POC Glucose (mg/dL)   234 H  240 H  (75-99)  mg/dL


 


Hemoglobin A1c     (4.0-6.0)  %


 


Calcium     (8.7-10.3)  mg/dL


 


Ferritin     (10.0-291.0)  ng/mL


 


Lactate Dehydrogenase     (120-246)  U/L


 


C-Reactive Protein     (0.0-0.8)  mg/dL


 


Total Protein     (6.2-8.2)  g/dL


 


Albumin     (3.80-4.90)  g/dL


 


Albumin/Globulin Ratio     (1.60-3.17)  g/dL


 


Urine Protein  Trace H    (Negative)  


 


Urine Glucose (UA)  2+ H    (Negative)  


 


Ur Leukocyte Esterase  Small H    (Negative)  


 


Urine Bacteria  Rare H    (None)  /hpf


 


Urine Mucus  Rare H    (None)  /hpf














  11/25/20 11/25/20 11/25/20 Range/Units





  05:34 05:34 06:59 


 


WBC  11.9 H    (3.8-10.6)  k/uL


 


RBC  3.71 L    (3.80-5.40)  m/uL


 


Neutrophils #  9.5 H    (1.3-7.7)  k/uL


 


D-Dimer   1.10 H   (<0.60)  mg/L FEU


 


BUN/Creatinine Ratio     (12.00-20.00)  Ratio


 


Glucose     ()  mg/dL


 


POC Glucose (mg/dL)    70 L  (75-99)  mg/dL


 


Hemoglobin A1c     (4.0-6.0)  %


 


Calcium     (8.7-10.3)  mg/dL


 


Ferritin     (10.0-291.0)  ng/mL


 


Lactate Dehydrogenase     (120-246)  U/L


 


C-Reactive Protein     (0.0-0.8)  mg/dL


 


Total Protein     (6.2-8.2)  g/dL


 


Albumin     (3.80-4.90)  g/dL


 


Albumin/Globulin Ratio     (1.60-3.17)  g/dL


 


Urine Protein     (Negative)  


 


Urine Glucose (UA)     (Negative)  


 


Ur Leukocyte Esterase     (Negative)  


 


Urine Bacteria     (None)  /hpf


 


Urine Mucus     (None)  /hpf








                      Microbiology - Last 24 Hours (Table)











 11/24/20 15:39 Urine Culture - Preliminary





 Urine,Voided 


 


 11/22/20 13:45 Blood Culture - Preliminary





 Blood    No Growth after 48 hours

## 2020-11-25 NOTE — P.PN
Subjective


Progress Note Date: 11/25/20


Principal diagnosis: 


 Acute COVID 19 pneumonitis


 This is a 75-year-old  female patient of Dr. Jose M Cano, with past

medical history of diabetes mellitus, who presented to the emergency department 

on 11/22/2024 evaluation of continuous nausea, vomiting and diarrhea, and 

patient also developed a fever.  Patient is a positive for COVID 19 1 week ago. 

She was seen in the emergency department for nausea and vomiting.  She received 

some IV hydration felt improvement, she has also abated a course of Levaquin for

a mild pneumonia.  Patient is a poor historian related to language barrier, we 

think her primary language is [place].  Most the history was obtained from the 

chart.  Patient was taken Zofran for nausea and vomiting, and felt some 

improvement last week however she ran out of Zofran and started vomiting again. 

Patient denied having any cough, shortness of breath or chest pain.  She was 

having intermittent diarrhea, denied any dysuria.  Denied any headaches, sore 

throat, no abdominal pain, just feeling nauseous and crampy.  She was febrile on

presentation to temp of 100F.  Pulse ox was 97% on room air.  Chest x-ray 

showing progression of bilateral infiltrates compared x completed on 11/15/2020.

 Lab work showed mild leukocytosis, with white blood cell count of 13.9, 

lymphopenia with the lymphocyte count of 0.6, sodium was 128, potassium 3.7, ch

loride was 89, renal profile was unremarkable, plasma lactic acid was mildly 

elevated at 3.2, LFTs were within normal limits.  LDH was 750, CRP was 256, down

to 201 on today's labs, urinalysis showed 3+ protein, trace ketones, small 

amount of blood, increased leukocytes and possibility of urinary tract 

infection, influenza screen was negative.  Patient was given a liter bolus in 

the emergency department, lactic acid has improved, and she continues on IV 

hydration with 0.9 normal saline at a rate of 75 ML per hour, she is on 

prophylactic dose of Lovenox, oral dexamethasone, and we will add Remdesivir to 

her regimen





On 11/24/2020 patient seen in follow-up on medical surgical floor, she feels she

is still short of breath, but appears to be in no acute distress, appears to be 

breathing comfortably at this time, he remains on room air, pulse ox is 92-93%, 

vital signs have been stable, she's been afebrile.  No cough.  No chest 

discomfort.  She is on day 2 of Remdesivir, she remains a prophylactic dose of 

Lovenox, and Decadron.  No acute events overnight, but remains weak overall.





On 11/25/2020 patient seen in follow-up on medical surgical floor, denies 

shortness of breath, she is resting comfortably in bed, breathing comfortably, 

pulse ox is 94%, vital signs have stable overnight, she's been afebrile, 

diarrhea has improved, she denies any abdominal pain, she will receive her third

dose of Remdesivir, a pulmonary perspective she has been stable, continues on 

prophylactic dose of Lovenox, vitamin C, zinc supplement, and oral Decadron. 














Objective





- Vital Signs


Vital signs: 


                                   Vital Signs











Temp  97.8 F   11/25/20 13:41


 


Pulse  64   11/25/20 13:41


 


Resp  17   11/25/20 13:41


 


BP  136/67   11/25/20 13:41


 


Pulse Ox  94 L  11/25/20 13:41








                                 Intake & Output











 11/24/20 11/25/20 11/25/20





 18:59 06:59 18:59


 


Intake Total 850 1015 


 


Balance 850 1015 


 


Intake:   


 


  Intake, IV Titration 850 725 





  Amount   


 


    Remdesivir (Eua) 100 mg 250  





    In Sodium Chloride 0.9%   





    250 ml @ 250 mls/hr IVPB   





    DAILY@1400 [facility] Rx#:   





    [id number]   


 


    Sodium Chloride 0.9% 1, 600 725 





    000 ml @ 75 mls/hr IV .   





    [id number] [facility] Rx#:[id number]   


 


  Oral  290 


 


Other:   


 


  Voiding Method  Toilet Toilet


 


  # Voids 5 4 


 


  # Bowel Movements  0 














- Exam


 GENERAL EXAM: Alert, very pleasant, 75-year-old  female, on room air, 

with a pulse ox of 94%, comfortable in no apparent distress.


HEAD: Normocephalic/atraumatic.


EYES: Normal reaction of pupils, equal size.  Conjunctiva pink, sclera white.


NOSE: Clear with pink turbinates.


THROAT: No erythema or exudates.


NECK: No masses, no JVD, no thyroid enlargement, no adenopathy.


CHEST: No chest wall deformity.  Symmetrical expansion. 


LUNGS: Equal air entry with no crackles, wheeze, rhonchi or dullness.


CVS: Regular rate and rhythm, normal S1 and S2, no gallops, no murmurs, no rubs


ABDOMEN: Soft, nontender.  No hepatosplenomegaly, normal bowel sounds, no 

guarding or rigidity.


EXTREMITIES: No clubbing, no edema, no cyanosis, 2+ pulses and upper and lower 

extremities.


MUSCULOSKELETAL: Muscle strength and tone normal.


SPINE: No scoliosis or deformity


SKIN: No rashes


CENTRAL NERVOUS SYSTEM: Alert and oriented -3.  No focal deficits, tone is 

normal in all 4 extremities.


PSYCHIATRIC: Alert and oriented -3.  Appropriate affect.  Intact judgment and 

insight.








- Labs


CBC & Chem 7: 


                                 11/25/20 05:34





                                 11/25/20 05:34


Labs: 


                  Abnormal Lab Results - Last 24 Hours (Table)











  11/24/20 11/24/20 11/24/20 Range/Units





  06:19 15:39 17:14 


 


WBC     (3.8-10.6)  k/uL


 


RBC     (3.80-5.40)  m/uL


 


Neutrophils #     (1.3-7.7)  k/uL


 


D-Dimer     (<0.60)  mg/L FEU


 


BUN/Creatinine Ratio     (12.00-20.00)  Ratio


 


POC Glucose (mg/dL)    234 H  (75-99)  mg/dL


 


Hemoglobin A1c  7.0 H    (4.0-6.0)  %


 


Calcium     (8.7-10.3)  mg/dL


 


Lactate Dehydrogenase     (120-246)  U/L


 


C-Reactive Protein     (0.0-0.8)  mg/dL


 


Total Protein     (6.2-8.2)  g/dL


 


Albumin     (3.80-4.90)  g/dL


 


Albumin/Globulin Ratio     (1.60-3.17)  g/dL


 


Urine Protein   Trace H   (Negative)  


 


Urine Glucose (UA)   2+ H   (Negative)  


 


Ur Leukocyte Esterase   Small H   (Negative)  


 


Urine Bacteria   Rare H   (None)  /hpf


 


Urine Mucus   Rare H   (None)  /hpf














  11/24/20 11/25/20 11/25/20 Range/Units





  19:49 05:34 05:34 


 


WBC   11.9 H   (3.8-10.6)  k/uL


 


RBC   3.71 L   (3.80-5.40)  m/uL


 


Neutrophils #   9.5 H   (1.3-7.7)  k/uL


 


D-Dimer     (<0.60)  mg/L FEU


 


BUN/Creatinine Ratio    23.33 H  (12.00-20.00)  Ratio


 


POC Glucose (mg/dL)  240 H    (75-99)  mg/dL


 


Hemoglobin A1c     (4.0-6.0)  %


 


Calcium    8.4 L  (8.7-10.3)  mg/dL


 


Lactate Dehydrogenase     (120-246)  U/L


 


C-Reactive Protein     (0.0-0.8)  mg/dL


 


Total Protein    5.3 L  (6.2-8.2)  g/dL


 


Albumin    3.20 L  (3.80-4.90)  g/dL


 


Albumin/Globulin Ratio    1.52 L  (1.60-3.17)  g/dL


 


Urine Protein     (Negative)  


 


Urine Glucose (UA)     (Negative)  


 


Ur Leukocyte Esterase     (Negative)  


 


Urine Bacteria     (None)  /hpf


 


Urine Mucus     (None)  /hpf














  11/25/20 11/25/20 11/25/20 Range/Units





  05:34 05:34 06:59 


 


WBC     (3.8-10.6)  k/uL


 


RBC     (3.80-5.40)  m/uL


 


Neutrophils #     (1.3-7.7)  k/uL


 


D-Dimer  1.10 H    (<0.60)  mg/L FEU


 


BUN/Creatinine Ratio     (12.00-20.00)  Ratio


 


POC Glucose (mg/dL)    70 L  (75-99)  mg/dL


 


Hemoglobin A1c     (4.0-6.0)  %


 


Calcium     (8.7-10.3)  mg/dL


 


Lactate Dehydrogenase   333 H   (120-246)  U/L


 


C-Reactive Protein   5.1 H   (0.0-0.8)  mg/dL


 


Total Protein     (6.2-8.2)  g/dL


 


Albumin     (3.80-4.90)  g/dL


 


Albumin/Globulin Ratio     (1.60-3.17)  g/dL


 


Urine Protein     (Negative)  


 


Urine Glucose (UA)     (Negative)  


 


Ur Leukocyte Esterase     (Negative)  


 


Urine Bacteria     (None)  /hpf


 


Urine Mucus     (None)  /hpf














  11/25/20 Range/Units





  12:07 


 


WBC   (3.8-10.6)  k/uL


 


RBC   (3.80-5.40)  m/uL


 


Neutrophils #   (1.3-7.7)  k/uL


 


D-Dimer   (<0.60)  mg/L FEU


 


BUN/Creatinine Ratio   (12.00-20.00)  Ratio


 


POC Glucose (mg/dL)  219 H  (75-99)  mg/dL


 


Hemoglobin A1c   (4.0-6.0)  %


 


Calcium   (8.7-10.3)  mg/dL


 


Lactate Dehydrogenase   (120-246)  U/L


 


C-Reactive Protein   (0.0-0.8)  mg/dL


 


Total Protein   (6.2-8.2)  g/dL


 


Albumin   (3.80-4.90)  g/dL


 


Albumin/Globulin Ratio   (1.60-3.17)  g/dL


 


Urine Protein   (Negative)  


 


Urine Glucose (UA)   (Negative)  


 


Ur Leukocyte Esterase   (Negative)  


 


Urine Bacteria   (None)  /hpf


 


Urine Mucus   (None)  /hpf








                      Microbiology - Last 24 Hours (Table)











 11/24/20 15:39 Urine Culture - Preliminary





 Urine,Voided 


 


 11/22/20 13:45 Blood Culture - Preliminary





 Blood    No Growth after 48 hours














Assessment and Plan


Plan: 


 Assessment:





#1.  Acute COVID 19 pneumonitis 





#2.  Nausea, vomiting, diarrhea related to acute COVID 19 infection, improved





#3.  Hyponatremia, likely hypovolemic of related to protracted nausea vomiting 

and diarrhea, improved





#4.  Mild lactic acidosis, improved with IV hydration





#5.  Possible urinary tract infection





#6.  Diabetes mellitus type 2





#7.  Increased inflammatory markers related to acute COVID 19 infection





Plan:





Continue current medical treatment, follow up chest x-ray has been reviewed 

still showing significant bilateral infiltrates, fairly stable in appearance, 

clinically patient is any worsening dyspnea, she is breathing comfortably, she 

has maintained stable O2 saturations, no acute events overnight, she will 

receive her dose of Remdesivir today, however within the treatment short, as the

patient has remained stable, we'll continue on oral Decadron for a total of 10 

day course.  She stable for discharge home from pulmonary perspective


I performed a history & physical examination of the patient and discussed their 

management with my nurse practitioner, Justine Maciel.  I reviewed the nurse 

practitioner's note and agree with the documented findings and plan of care.  

Lung sounds are positive for bibasilar crackles.  The findings and the 

impression was discussed with the patient.  I attest to the documentation by the

nurse practitioner. 





Time with Patient: Less than 30

## 2020-11-25 NOTE — XR
EXAMINATION TYPE: XR chest 1V portable

 

DATE OF EXAM: 11/25/2020

 

CLINICAL HISTORY: COVID 19.

 

TECHNIQUE:  Portable frontal view of the chest.

 

COMPARISON: 11/23/2020 chest radiograph

 

FINDINGS:  The cardiomediastinal silhouette is within normal limits for size. Diffuse bilateral perip
heral bandlike airspace opacities of the lungs are unchanged versus 11/23/2020. No pleural effusion, 
or pneumothorax seen. 

 

IMPRESSION:  Unchanged diffuse peripheral airspace opacities versus 11/23/2020, consistent with Covid
 19 viral infection.